# Patient Record
Sex: FEMALE | Race: WHITE | NOT HISPANIC OR LATINO | URBAN - NONMETROPOLITAN AREA
[De-identification: names, ages, dates, MRNs, and addresses within clinical notes are randomized per-mention and may not be internally consistent; named-entity substitution may affect disease eponyms.]

---

## 2021-09-27 ENCOUNTER — OFFICE VISIT (OUTPATIENT)
Dept: URBAN - NONMETROPOLITAN AREA CLINIC 13 | Facility: CLINIC | Age: 71
End: 2021-09-27

## 2021-10-06 ENCOUNTER — WEB ENCOUNTER (OUTPATIENT)
Dept: URBAN - NONMETROPOLITAN AREA CLINIC 13 | Facility: CLINIC | Age: 71
End: 2021-10-06

## 2021-10-06 ENCOUNTER — OFFICE VISIT (OUTPATIENT)
Dept: URBAN - NONMETROPOLITAN AREA CLINIC 13 | Facility: CLINIC | Age: 71
End: 2021-10-06
Payer: MEDICARE

## 2021-10-06 ENCOUNTER — LAB OUTSIDE AN ENCOUNTER (OUTPATIENT)
Dept: URBAN - NONMETROPOLITAN AREA CLINIC 13 | Facility: CLINIC | Age: 71
End: 2021-10-06

## 2021-10-06 DIAGNOSIS — R13.19 ESOPHAGEAL DYSPHAGIA: ICD-10-CM

## 2021-10-06 DIAGNOSIS — R49.0 HOARSENESS: ICD-10-CM

## 2021-10-06 DIAGNOSIS — K21.9 GASTROESOPHAGEAL REFLUX DISEASE WITHOUT ESOPHAGITIS: ICD-10-CM

## 2021-10-06 DIAGNOSIS — Z12.11 COLON CANCER SCREENING: ICD-10-CM

## 2021-10-06 PROCEDURE — 99204 OFFICE O/P NEW MOD 45 MIN: CPT | Performed by: NURSE PRACTITIONER

## 2021-10-06 RX ORDER — PANTOPRAZOLE SODIUM 40 MG/1
1 TABLET TABLET, DELAYED RELEASE ORAL BID
Qty: 180 TABLET | Refills: 3 | OUTPATIENT
Start: 2021-10-06

## 2021-10-06 RX ORDER — DEXLANSOPRAZOLE 60 MG/1
1 CAPSULE CAPSULE, DELAYED RELEASE ORAL ONCE A DAY
OUTPATIENT

## 2021-10-06 RX ORDER — DEXLANSOPRAZOLE 60 MG/1
1 CAPSULE CAPSULE, DELAYED RELEASE ORAL ONCE A DAY
Status: ACTIVE | COMMUNITY

## 2021-10-06 NOTE — HPI-TODAY'S VISIT:
10/6/21 Mrs Oralia Romero is a 72 YO F who presents for first visit sine 2014 with complaints of heartburn and reflux despite Dexilant, hoarseness, and dysphagia for solids. She had similar sypmtoms in 2014. Her dysphagia resolved after dilation. Recently, she has dyspeptic symptoms with breakthrough heartburn.  Last EGD in 2008 with mild gastritis and no findings to explain her dysphagia but improvement in her symptoms after dilation. She last had colonoscopy 3/2014 with sigmoid diverticulosis and recommended to repeat in 10 years. TG

## 2021-11-09 ENCOUNTER — CLAIMS CREATED FROM THE CLAIM WINDOW (OUTPATIENT)
Dept: URBAN - METROPOLITAN AREA CLINIC 4 | Facility: CLINIC | Age: 71
End: 2021-11-09
Payer: MEDICARE

## 2021-11-09 ENCOUNTER — OFFICE VISIT (OUTPATIENT)
Dept: URBAN - NONMETROPOLITAN AREA SURGERY CENTER 1 | Facility: SURGERY CENTER | Age: 71
End: 2021-11-09
Payer: MEDICARE

## 2021-11-09 DIAGNOSIS — K31.89 DEFORMED PYLORUS, ACQUIRED: ICD-10-CM

## 2021-11-09 DIAGNOSIS — K21.9 GASTRO-ESOPHAGEAL REFLUX DISEASE WITHOUT ESOPHAGITIS: ICD-10-CM

## 2021-11-09 DIAGNOSIS — R13.19 CERVICAL DYSPHAGIA: ICD-10-CM

## 2021-11-09 DIAGNOSIS — K21.9 ACID REFLUX: ICD-10-CM

## 2021-11-09 DIAGNOSIS — K31.89 ACQUIRED DEFORMITY OF DUODENUM: ICD-10-CM

## 2021-11-09 PROCEDURE — 43239 EGD BIOPSY SINGLE/MULTIPLE: CPT | Performed by: INTERNAL MEDICINE

## 2021-11-09 PROCEDURE — 88305 TISSUE EXAM BY PATHOLOGIST: CPT | Performed by: PATHOLOGY

## 2021-11-09 PROCEDURE — 88312 SPECIAL STAINS GROUP 1: CPT | Performed by: PATHOLOGY

## 2021-11-09 PROCEDURE — G8907 PT DOC NO EVENTS ON DISCHARG: HCPCS | Performed by: INTERNAL MEDICINE

## 2021-11-09 PROCEDURE — 43450 DILATE ESOPHAGUS 1/MULT PASS: CPT | Performed by: INTERNAL MEDICINE

## 2021-12-08 ENCOUNTER — OFFICE VISIT (OUTPATIENT)
Dept: URBAN - NONMETROPOLITAN AREA CLINIC 13 | Facility: CLINIC | Age: 71
End: 2021-12-08
Payer: MEDICARE

## 2021-12-08 ENCOUNTER — WEB ENCOUNTER (OUTPATIENT)
Dept: URBAN - NONMETROPOLITAN AREA CLINIC 13 | Facility: CLINIC | Age: 71
End: 2021-12-08

## 2021-12-08 DIAGNOSIS — Z12.11 COLON CANCER SCREENING: ICD-10-CM

## 2021-12-08 DIAGNOSIS — R13.19 ESOPHAGEAL DYSPHAGIA: ICD-10-CM

## 2021-12-08 DIAGNOSIS — R49.0 HOARSENESS: ICD-10-CM

## 2021-12-08 DIAGNOSIS — K21.9 GASTROESOPHAGEAL REFLUX DISEASE WITHOUT ESOPHAGITIS: ICD-10-CM

## 2021-12-08 PROCEDURE — 99213 OFFICE O/P EST LOW 20 MIN: CPT | Performed by: NURSE PRACTITIONER

## 2021-12-08 RX ORDER — PANTOPRAZOLE SODIUM 40 MG/1
1 TABLET TABLET, DELAYED RELEASE ORAL BID
OUTPATIENT
Start: 2021-10-06

## 2021-12-08 RX ORDER — PANTOPRAZOLE SODIUM 40 MG/1
1 TABLET TABLET, DELAYED RELEASE ORAL BID
Qty: 180 TABLET | Refills: 3 | Status: ACTIVE | COMMUNITY
Start: 2021-10-06

## 2021-12-08 NOTE — HPI-TODAY'S VISIT:
10/6/21 Mrs Oralia Romero is a 72 YO F who presents for first visit sine 2014 with complaints of heartburn and reflux despite Dexilant, hoarseness, and dysphagia for solids. She had similar sypmtoms in 2014. Her dysphagia resolved after dilation. Recently, she has dyspeptic symptoms with breakthrough heartburn.  Last EGD in 2008 with mild gastritis and no findings to explain her dysphagia but improvement in her symptoms after dilation. She last had colonoscopy 3/2014 with sigmoid diverticulosis and recommended to repeat in 10 years. TG  12/8/2021 Ms Oralia Romero presents for follow up for GERD symptoms after completing EGD and UGI.   UGI 10/29/2021: small sliding HH, mild GERD; tablet passed EGD 11/9/2021: mildly severe distal erosive esophagitis; nothing to explain dysphagia s/p empiric dilation with [place] 50Fr; small HH. Path c/w gastropathy negative for h. pylori, GE junction and random esophageal bx c/w reflux changes, no EoE or Barretts.  She is doing much better on pantoprazole 40mg bid. This works better than the Dexilant was working. She does continue with some cough that is nonproductive, hoarseness, and had severe heartburn after eating BBQ a few days ago. This resulted in some vomiting. Denies abdominal pain. No new issues. TG

## 2022-03-09 ENCOUNTER — OFFICE VISIT (OUTPATIENT)
Dept: URBAN - NONMETROPOLITAN AREA CLINIC 13 | Facility: CLINIC | Age: 72
End: 2022-03-09
Payer: MEDICARE

## 2022-03-09 ENCOUNTER — WEB ENCOUNTER (OUTPATIENT)
Dept: URBAN - NONMETROPOLITAN AREA CLINIC 13 | Facility: CLINIC | Age: 72
End: 2022-03-09

## 2022-03-09 DIAGNOSIS — R49.0 HOARSENESS: ICD-10-CM

## 2022-03-09 DIAGNOSIS — Z12.11 COLON CANCER SCREENING: ICD-10-CM

## 2022-03-09 DIAGNOSIS — R13.19 ESOPHAGEAL DYSPHAGIA: ICD-10-CM

## 2022-03-09 DIAGNOSIS — K21.9 GASTROESOPHAGEAL REFLUX DISEASE WITHOUT ESOPHAGITIS: ICD-10-CM

## 2022-03-09 PROCEDURE — 99214 OFFICE O/P EST MOD 30 MIN: CPT | Performed by: NURSE PRACTITIONER

## 2022-03-09 RX ORDER — FAMOTIDINE 40 MG/1
1 TABLET AT BEDTIME TABLET, FILM COATED ORAL ONCE A DAY
Qty: 90 TABLET | Refills: 3 | OUTPATIENT
Start: 2022-03-09

## 2022-03-09 RX ORDER — PANTOPRAZOLE SODIUM 40 MG/1
1 TABLET TABLET, DELAYED RELEASE ORAL BID
Status: ACTIVE | COMMUNITY
Start: 2021-10-06

## 2022-03-09 RX ORDER — PANTOPRAZOLE SODIUM 40 MG/1
1 TABLET 30 MINUTES BEFORE A MEAL TABLET, DELAYED RELEASE ORAL TWICE DAILY
Qty: 180 TABLET | Refills: 3
Start: 2021-10-06

## 2022-03-09 NOTE — HPI-TODAY'S VISIT:
10/6/21 Mrs Oralia Romero is a 70 YO F who presents for first visit sine 2014 with complaints of heartburn and reflux despite Dexilant, hoarseness, and dysphagia for solids. She had similar sypmtoms in 2014. Her dysphagia resolved after dilation. Recently, she has dyspeptic symptoms with breakthrough heartburn.  Last EGD in 2008 with mild gastritis and no findings to explain her dysphagia but improvement in her symptoms after dilation. TG  12/8/2021 Ms Oralia Romero presents for follow up for GERD symptoms after completing EGD and UGI.   UGI 10/29/2021: small sliding HH, mild GERD; tablet passed EGD 11/9/2021: mildly severe distal erosive esophagitis; nothing to explain dysphagia s/p empiric dilation with [place] 50Fr; small HH. Path c/w gastropathy negative for h. pylori, GE junction and random esophageal bx c/w reflux changes, no EoE or Barretts.  She is doing much better on pantoprazole 40mg bid. This works better than the Dexilant was working. She does continue with some cough that is nonproductive, hoarseness, and had severe heartburn after eating BBQ a few days ago. This resulted in some vomiting. Denies abdominal pain. No new issues. TG  3/9/22 Mrs Barton presents for follow up for GERD. Her , Kwaku, is with her today. She continues to struggle with heartburn with burning in her lower chest. At times sx are so severe she is nauseated and feels like she may vomit. She is taking pantoprazole in the morning, but after she has 2 cups of coffee. She often forgets her evening dose. If she remembers it, she takes it at bedtime rather than prior to supper. She takes Maalox or mylanta when she has severe symtpoms.  She drinks 2 cups of coffee in AM and 2 cups of coffee in the evenings. Otherwise, she is doing a good job with reflux precautions. No dysphagia since dilation in November.  She last had colonoscopy 3/2014 with sigmoid diverticulosis and recommended to repeat in 10 years. TG

## 2022-05-04 ENCOUNTER — OFFICE VISIT (OUTPATIENT)
Dept: URBAN - NONMETROPOLITAN AREA CLINIC 13 | Facility: CLINIC | Age: 72
End: 2022-05-04
Payer: MEDICARE

## 2022-05-04 DIAGNOSIS — K21.9 GASTROESOPHAGEAL REFLUX DISEASE WITHOUT ESOPHAGITIS: ICD-10-CM

## 2022-05-04 DIAGNOSIS — Z12.11 COLON CANCER SCREENING: ICD-10-CM

## 2022-05-04 DIAGNOSIS — R13.19 ESOPHAGEAL DYSPHAGIA: ICD-10-CM

## 2022-05-04 DIAGNOSIS — R49.0 HOARSENESS: ICD-10-CM

## 2022-05-04 PROCEDURE — 99213 OFFICE O/P EST LOW 20 MIN: CPT | Performed by: NURSE PRACTITIONER

## 2022-05-04 RX ORDER — FAMOTIDINE 40 MG/1
1 TABLET AT BEDTIME TABLET, FILM COATED ORAL ONCE A DAY
Qty: 90 TABLET | Refills: 3 | Status: ACTIVE | COMMUNITY
Start: 2022-03-09

## 2022-05-04 RX ORDER — PANTOPRAZOLE SODIUM 40 MG/1
1 TABLET 30 MINUTES BEFORE A MEAL TABLET, DELAYED RELEASE ORAL TWICE DAILY
Qty: 180 TABLET | Refills: 3 | Status: ACTIVE | COMMUNITY
Start: 2021-10-06

## 2022-05-04 RX ORDER — PANTOPRAZOLE SODIUM 40 MG/1
1 TABLET 30 MINUTES BEFORE A MEAL TABLET, DELAYED RELEASE ORAL TWICE DAILY
OUTPATIENT
Start: 2021-10-06

## 2022-05-04 RX ORDER — FAMOTIDINE 40 MG/1
1 TABLET AT BEDTIME TABLET, FILM COATED ORAL ONCE A DAY
OUTPATIENT
Start: 2022-03-09

## 2022-05-04 NOTE — HPI-TODAY'S VISIT:
10/6/21 Mrs Oralia Romero is a 70 YO F who presents for first visit sine 2014 with complaints of heartburn and reflux despite Dexilant, hoarseness, and dysphagia for solids. She had similar sypmtoms in 2014. Her dysphagia resolved after dilation. Recently, she has dyspeptic symptoms with breakthrough heartburn.  Last EGD in 2008 with mild gastritis and no findings to explain her dysphagia but improvement in her symptoms after dilation. TG  12/8/2021 Ms Oralia Romero presents for follow up for GERD symptoms after completing EGD and UGI.   UGI 10/29/2021: small sliding HH, mild GERD; tablet passed EGD 11/9/2021: mildly severe distal erosive esophagitis; nothing to explain dysphagia s/p empiric dilation with [place] 50Fr; small HH. Path c/w gastropathy negative for h. pylori, GE junction and random esophageal bx c/w reflux changes, no EoE or Barretts.  She is doing much better on pantoprazole 40mg bid. This works better than the Dexilant was working. She does continue with some cough that is nonproductive, hoarseness, and had severe heartburn after eating BBQ a few days ago. This resulted in some vomiting. Denies abdominal pain. No new issues. TG  3/9/22 Mrs Barton presents for follow up for GERD. Her , Kwaku, is with her today. She continues to struggle with heartburn with burning in her lower chest. At times sx are so severe she is nauseated and feels like she may vomit. She is taking pantoprazole in the morning, but after she has 2 cups of coffee. She often forgets her evening dose. If she remembers it, she takes it at bedtime rather than prior to supper. She takes Maalox or mylanta when she has severe symtpoms.  She drinks 2 cups of coffee in AM and 2 cups of coffee in the evenings. Otherwise, she is doing a good job with reflux precautions. No dysphagia since dilation in November.  She last had colonoscopy 3/2014 with sigmoid diverticulosis and recommended to repeat in 10 years. TG  5/4/2022 Ms Barton presents for follow up for GERD. Kwaku is with her today. She has reduced her coffee intake to 1 1/2 cups in AM and 1 1/2 cups in the afternoon. She is better with taking pantoprazole 30 minutes before breakfast and 30 minutes before supper with her famotidine at bedtime. Symptoms are improved. She has breakthrough that leads to nausea about once every two weeks. She is not adhering to reflux diet. We discussed this again today and gave handout. We had a long discussion regarding BRAVO and possibly surgery however she does not want to pursue that at this time. She would like to work harder on dietary management. Discussed side effects of high dose PPI and ideally would reduce to before breakfast with famotidine at bedtime. She verbalized understanding. TG

## 2022-11-04 ENCOUNTER — OFFICE VISIT (OUTPATIENT)
Dept: URBAN - NONMETROPOLITAN AREA CLINIC 13 | Facility: CLINIC | Age: 72
End: 2022-11-04
Payer: MEDICARE

## 2022-11-04 VITALS
HEART RATE: 73 BPM | SYSTOLIC BLOOD PRESSURE: 145 MMHG | BODY MASS INDEX: 24.99 KG/M2 | DIASTOLIC BLOOD PRESSURE: 89 MMHG | WEIGHT: 150 LBS | HEIGHT: 65 IN

## 2022-11-04 DIAGNOSIS — R13.19 ESOPHAGEAL DYSPHAGIA: ICD-10-CM

## 2022-11-04 DIAGNOSIS — R49.0 HOARSENESS: ICD-10-CM

## 2022-11-04 DIAGNOSIS — K21.9 GASTROESOPHAGEAL REFLUX DISEASE WITHOUT ESOPHAGITIS: ICD-10-CM

## 2022-11-04 DIAGNOSIS — Z12.11 COLON CANCER SCREENING: ICD-10-CM

## 2022-11-04 PROCEDURE — 99213 OFFICE O/P EST LOW 20 MIN: CPT | Performed by: NURSE PRACTITIONER

## 2022-11-04 RX ORDER — FAMOTIDINE 40 MG/1
1 TABLET AT BEDTIME TABLET, FILM COATED ORAL ONCE A DAY
Status: ACTIVE | COMMUNITY
Start: 2022-03-09

## 2022-11-04 RX ORDER — PANTOPRAZOLE SODIUM 40 MG/1
1 TABLET TABLET, DELAYED RELEASE ORAL TWICE A DAY
Qty: 180 TABLET | Refills: 3 | OUTPATIENT

## 2022-11-04 RX ORDER — FAMOTIDINE 40 MG/1
1 TABLET AT BEDTIME TABLET, FILM COATED ORAL ONCE A DAY
Qty: 90 TABLET | Refills: 3 | OUTPATIENT

## 2022-11-04 RX ORDER — PANTOPRAZOLE SODIUM 40 MG/1
1 TABLET 30 MINUTES BEFORE A MEAL TABLET, DELAYED RELEASE ORAL TWICE DAILY
Status: ACTIVE | COMMUNITY
Start: 2021-10-06

## 2022-11-04 NOTE — HPI-TODAY'S VISIT:
11/4/2022 Ms Barton presents for follow up for GERD. She was last seen in May by Inna Junior NP. She was doing ok at her last OV so her protonix was reduced to 40mg in the morning and pecid at bedtime. She is now having worsening reflux. She is trying to watch her diet but symptoms continue. She denies any trouble swallowing. CS

## 2023-03-03 ENCOUNTER — OFFICE VISIT (OUTPATIENT)
Dept: URBAN - NONMETROPOLITAN AREA CLINIC 13 | Facility: CLINIC | Age: 73
End: 2023-03-03

## 2023-05-25 ENCOUNTER — OFFICE VISIT (OUTPATIENT)
Dept: URBAN - NONMETROPOLITAN AREA CLINIC 13 | Facility: CLINIC | Age: 73
End: 2023-05-25
Payer: MEDICARE

## 2023-05-25 ENCOUNTER — LAB OUTSIDE AN ENCOUNTER (OUTPATIENT)
Dept: URBAN - NONMETROPOLITAN AREA CLINIC 13 | Facility: CLINIC | Age: 73
End: 2023-05-25

## 2023-05-25 VITALS
WEIGHT: 154.8 LBS | DIASTOLIC BLOOD PRESSURE: 89 MMHG | HEIGHT: 65 IN | HEART RATE: 78 BPM | BODY MASS INDEX: 25.79 KG/M2 | SYSTOLIC BLOOD PRESSURE: 150 MMHG

## 2023-05-25 DIAGNOSIS — K21.9 GASTROESOPHAGEAL REFLUX DISEASE WITHOUT ESOPHAGITIS: ICD-10-CM

## 2023-05-25 DIAGNOSIS — Z12.11 COLON CANCER SCREENING: ICD-10-CM

## 2023-05-25 DIAGNOSIS — R13.19 ESOPHAGEAL DYSPHAGIA: ICD-10-CM

## 2023-05-25 DIAGNOSIS — R49.0 HOARSENESS: ICD-10-CM

## 2023-05-25 PROCEDURE — 99214 OFFICE O/P EST MOD 30 MIN: CPT | Performed by: NURSE PRACTITIONER

## 2023-05-25 RX ORDER — FAMOTIDINE 40 MG/1
1 TABLET AT BEDTIME TABLET, FILM COATED ORAL ONCE A DAY
Qty: 90 TABLET | Refills: 3 | Status: ACTIVE | COMMUNITY

## 2023-05-25 RX ORDER — FAMOTIDINE 40 MG/1
1 TABLET AT BEDTIME TABLET, FILM COATED ORAL ONCE A DAY
Qty: 90 TABLET | Refills: 3 | OUTPATIENT

## 2023-05-25 RX ORDER — PANTOPRAZOLE SODIUM 40 MG/1
1 TABLET TABLET, DELAYED RELEASE ORAL TWICE A DAY
Qty: 180 TABLET | Refills: 3 | Status: ACTIVE | COMMUNITY

## 2023-05-25 RX ORDER — SUCRALFATE 1 G/1
1 TABLET DISSOLVED IN A TABLESPOON OF WATER ON AN EMPTY STOMACH TABLET ORAL
Qty: 120 TABLET | Refills: 11 | OUTPATIENT
Start: 2023-05-25 | End: 2024-05-19

## 2023-05-25 RX ORDER — OMEPRAZOLE 40 MG/1
1 CAPSULE 30 MINUTES BEFORE MEALS CAPSULE, DELAYED RELEASE ORAL TWICE A DAY
Qty: 180 | Refills: 3 | OUTPATIENT
Start: 2023-05-25

## 2023-05-25 NOTE — HPI-OTHER HISTORIES
10/6/21 Mrs Oralia Romero is a 72 YO F who presents for first visit sine 2014 with complaints of heartburn and reflux despite Dexilant, hoarseness, and dysphagia for solids. She had similar sypmtoms in 2014. Her dysphagia resolved after dilation. Recently, she has dyspeptic symptoms with breakthrough heartburn.  Last EGD in 2008 with mild gastritis and no findings to explain her dysphagia but improvement in her symptoms after dilation. TG  12/8/2021 Ms Oralia Romero presents for follow up for GERD symptoms after completing EGD and UGI.   UGI 10/29/2021: small sliding HH, mild GERD; tablet passed EGD 11/9/2021: mildly severe distal erosive esophagitis; nothing to explain dysphagia s/p empiric dilation with [place] 50Fr; small HH. Path c/w gastropathy negative for h. pylori, GE junction and random esophageal bx c/w reflux changes, no EoE or Barretts.  She is doing much better on pantoprazole 40mg bid. This works better than the Dexilant was working. She does continue with some cough that is nonproductive, hoarseness, and had severe heartburn after eating BBQ a few days ago. This resulted in some vomiting. Denies abdominal pain. No new issues. TG  3/9/22 Mrs Barton presents for follow up for GERD. Her , Kwaku, is with her today. She continues to struggle with heartburn with burning in her lower chest. At times sx are so severe she is nauseated and feels like she may vomit. She is taking pantoprazole in the morning, but after she has 2 cups of coffee. She often forgets her evening dose. If she remembers it, she takes it at bedtime rather than prior to supper. She takes Maalox or mylanta when she has severe symtpoms.  She drinks 2 cups of coffee in AM and 2 cups of coffee in the evenings. Otherwise, she is doing a good job with reflux precautions. No dysphagia since dilation in November.  She last had colonoscopy 3/2014 with sigmoid diverticulosis and recommended to repeat in 10 years. TG  5/4/2022 Ms Barton presents for follow up for GERD. Kwaku is with her today. She has reduced her coffee intake to 1 1/2 cups in AM and 1 1/2 cups in the afternoon. She is better with taking pantoprazole 30 minutes before breakfast and 30 minutes before supper with her famotidine at bedtime. Symptoms are improved. She has breakthrough that leads to nausea about once every two weeks. She is not adhering to reflux diet. We discussed this again today and gave handout. We had a long discussion regarding BRAVO and possibly surgery however she does not want to pursue that at this time. She would like to work harder on dietary management. Discussed side effects of high dose PPI and ideally would reduce to before breakfast with famotidine at bedtime. She verbalized understanding. TG  11/4/2022 Ms Barton presents for follow up for GERD. She was last seen in May by Inna Junior NP. She was doing ok at her last OV so her protonix was reduced to 40mg in the morning and pecid at bedtime. She is now having worsening reflux. She is trying to watch her diet but symptoms continue. She denies any trouble swallowing. CS

## 2023-05-25 NOTE — HPI-TODAY'S VISIT:
5/25/2023 Ms Barton presents for follow up for GERD. At her last OV, she was still having breakthrough on protonix 40mg daily and pepcid at bedtime. She was given protonix 40mg BID and pepcid at bedtime. Today, she continues to have a lot of burning in her esophagus. She denies any trouble swallowing. CS

## 2023-07-05 ENCOUNTER — CLAIMS CREATED FROM THE CLAIM WINDOW (OUTPATIENT)
Dept: URBAN - METROPOLITAN AREA CLINIC 4 | Facility: CLINIC | Age: 73
End: 2023-07-05
Payer: MEDICARE

## 2023-07-05 ENCOUNTER — OFFICE VISIT (OUTPATIENT)
Dept: URBAN - NONMETROPOLITAN AREA SURGERY CENTER 1 | Facility: SURGERY CENTER | Age: 73
End: 2023-07-05
Payer: MEDICARE

## 2023-07-05 DIAGNOSIS — K31.89 OTHER DISEASES OF STOMACH AND DUODENUM: ICD-10-CM

## 2023-07-05 DIAGNOSIS — T47.8X5A ADVERSE EFFECT OF OTHER AGENTS PRIMARILY AFFECTING GASTROINTESTINAL SYSTEM, INITIAL ENCOUNTER: ICD-10-CM

## 2023-07-05 DIAGNOSIS — K21.9 GASTROESOPHAGEAL REFLUX DISEASE WITHOUT ESOPHAGITIS: ICD-10-CM

## 2023-07-05 PROCEDURE — 88305 TISSUE EXAM BY PATHOLOGIST: CPT | Performed by: PATHOLOGY

## 2023-07-05 PROCEDURE — 88312 SPECIAL STAINS GROUP 1: CPT | Performed by: PATHOLOGY

## 2023-07-05 PROCEDURE — 43239 EGD BIOPSY SINGLE/MULTIPLE: CPT | Performed by: INTERNAL MEDICINE

## 2023-07-05 PROCEDURE — G8907 PT DOC NO EVENTS ON DISCHARG: HCPCS | Performed by: INTERNAL MEDICINE

## 2023-09-14 ENCOUNTER — OFFICE VISIT (OUTPATIENT)
Dept: URBAN - NONMETROPOLITAN AREA CLINIC 13 | Facility: CLINIC | Age: 73
End: 2023-09-14
Payer: MEDICARE

## 2023-09-14 ENCOUNTER — WEB ENCOUNTER (OUTPATIENT)
Dept: URBAN - NONMETROPOLITAN AREA CLINIC 13 | Facility: CLINIC | Age: 73
End: 2023-09-14

## 2023-09-14 VITALS
HEART RATE: 85 BPM | HEIGHT: 65 IN | WEIGHT: 154.6 LBS | BODY MASS INDEX: 25.76 KG/M2 | SYSTOLIC BLOOD PRESSURE: 126 MMHG | TEMPERATURE: 98.1 F | DIASTOLIC BLOOD PRESSURE: 75 MMHG

## 2023-09-14 DIAGNOSIS — R49.0 HOARSENESS: ICD-10-CM

## 2023-09-14 DIAGNOSIS — R13.19 ESOPHAGEAL DYSPHAGIA: ICD-10-CM

## 2023-09-14 DIAGNOSIS — K21.9 GASTROESOPHAGEAL REFLUX DISEASE WITHOUT ESOPHAGITIS: ICD-10-CM

## 2023-09-14 PROCEDURE — 99213 OFFICE O/P EST LOW 20 MIN: CPT | Performed by: NURSE PRACTITIONER

## 2023-09-14 RX ORDER — FAMOTIDINE 40 MG/1
1 TABLET AT BEDTIME TABLET, FILM COATED ORAL ONCE A DAY
Qty: 90 TABLET | Refills: 3 | Status: ACTIVE | COMMUNITY

## 2023-09-14 RX ORDER — OMEPRAZOLE 40 MG/1
1 CAPSULE 30 MINUTES BEFORE MEALS CAPSULE, DELAYED RELEASE ORAL TWICE A DAY
Qty: 180 | Refills: 3 | OUTPATIENT

## 2023-09-14 RX ORDER — AMITRIPTYLINE HYDROCHLORIDE 10 MG/1
1 TABLET AT BEDTIME TABLET, FILM COATED ORAL ONCE A DAY
Qty: 90 TABLET | Refills: 3 | OUTPATIENT
Start: 2023-09-14

## 2023-09-14 RX ORDER — FAMOTIDINE 40 MG/1
1 TABLET AT BEDTIME TABLET, FILM COATED ORAL ONCE A DAY
Qty: 90 TABLET | Refills: 3 | OUTPATIENT

## 2023-09-14 RX ORDER — PANTOPRAZOLE SODIUM 40 MG/1
1 TABLET TABLET, DELAYED RELEASE ORAL TWICE A DAY
Qty: 180 TABLET | Refills: 3 | Status: ACTIVE | COMMUNITY

## 2023-09-14 RX ORDER — OMEPRAZOLE 40 MG/1
1 CAPSULE 30 MINUTES BEFORE MEALS CAPSULE, DELAYED RELEASE ORAL TWICE A DAY
Qty: 180 | Refills: 3 | Status: ACTIVE | COMMUNITY
Start: 2023-05-25

## 2023-09-14 RX ORDER — SUCRALFATE 1 G/1
1 TABLET DISSOLVED IN A TABLESPOON OF WATER ON AN EMPTY STOMACH TABLET ORAL
Qty: 120 TABLET | Refills: 11 | Status: ACTIVE | COMMUNITY
Start: 2023-05-25 | End: 2024-05-19

## 2023-09-14 RX ORDER — SUCRALFATE 1 G/1
1 TABLET DISSOLVED IN A TABLESPOON OF WATER ON AN EMPTY STOMACH TABLET ORAL
Qty: 120 TABLET | Refills: 11 | OUTPATIENT

## 2023-09-14 NOTE — HPI-OTHER HISTORIES
10/6/21 Mrs Oralia Romero is a 70 YO F who presents for first visit sine 2014 with complaints of heartburn and reflux despite Dexilant, hoarseness, and dysphagia for solids. She had similar sypmtoms in 2014. Her dysphagia resolved after dilation. Recently, she has dyspeptic symptoms with breakthrough heartburn.  Last EGD in 2008 with mild gastritis and no findings to explain her dysphagia but improvement in her symptoms after dilation. TG  12/8/2021 Ms Oarlia Romero presents for follow up for GERD symptoms after completing EGD and UGI.   UGI 10/29/2021: small sliding HH, mild GERD; tablet passed EGD 11/9/2021: mildly severe distal erosive esophagitis; nothing to explain dysphagia s/p empiric dilation with [place] 50Fr; small HH. Path c/w gastropathy negative for h. pylori, GE junction and random esophageal bx c/w reflux changes, no EoE or Barretts.  She is doing much better on pantoprazole 40mg bid. This works better than the Dexilant was working. She does continue with some cough that is nonproductive, hoarseness, and had severe heartburn after eating BBQ a few days ago. This resulted in some vomiting. Denies abdominal pain. No new issues. TG  3/9/22 Mrs Barton presents for follow up for GERD. Her , Kwaku, is with her today. She continues to struggle with heartburn with burning in her lower chest. At times sx are so severe she is nauseated and feels like she may vomit. She is taking pantoprazole in the morning, but after she has 2 cups of coffee. She often forgets her evening dose. If she remembers it, she takes it at bedtime rather than prior to supper. She takes Maalox or mylanta when she has severe symtpoms.  She drinks 2 cups of coffee in AM and 2 cups of coffee in the evenings. Otherwise, she is doing a good job with reflux precautions. No dysphagia since dilation in November.  She last had colonoscopy 3/2014 with sigmoid diverticulosis and recommended to repeat in 10 years. TG  5/4/2022 Ms Barton presents for follow up for GERD. Kwaku is with her today. She has reduced her coffee intake to 1 1/2 cups in AM and 1 1/2 cups in the afternoon. She is better with taking pantoprazole 30 minutes before breakfast and 30 minutes before supper with her famotidine at bedtime. Symptoms are improved. She has breakthrough that leads to nausea about once every two weeks. She is not adhering to reflux diet. We discussed this again today and gave handout. We had a long discussion regarding BRAVO and possibly surgery however she does not want to pursue that at this time. She would like to work harder on dietary management. Discussed side effects of high dose PPI and ideally would reduce to before breakfast with famotidine at bedtime. She verbalized understanding. TG  11/4/2022 Ms Barton presents for follow up for GERD. She was last seen in May by Inna Junior NP. She was doing ok at her last OV so her protonix was reduced to 40mg in the morning and pecid at bedtime. She is now having worsening reflux. She is trying to watch her diet but symptoms continue. She denies any trouble swallowing. CS  5/25/2023 Ms Barton presents for follow up for GERD. At her last OV, she was still having breakthrough on protonix 40mg daily and pepcid at bedtime. She was given protonix 40mg BID and pepcid at bedtime. Today, she continues to have a lot of burning in her esophagus. She denies any trouble swallowing. CS  7/5/2023 EGD: smal hiatal hernia, gastritis. Path PPI effect

## 2023-09-14 NOTE — HPI-TODAY'S VISIT:
9/14/2023 Ms Barton presents for follow up for GERD. She is feeling better on omeprazole 40mg BID, pepcid, and carafate. She admits to a lot of stress and not sleeping well. She will still have some burning but overall better. CS

## 2023-11-16 ENCOUNTER — OFFICE VISIT (OUTPATIENT)
Dept: URBAN - NONMETROPOLITAN AREA CLINIC 13 | Facility: CLINIC | Age: 73
End: 2023-11-16

## 2023-11-30 ENCOUNTER — OFFICE VISIT (OUTPATIENT)
Dept: URBAN - NONMETROPOLITAN AREA CLINIC 13 | Facility: CLINIC | Age: 73
End: 2023-11-30
Payer: MEDICARE

## 2023-11-30 ENCOUNTER — LAB OUTSIDE AN ENCOUNTER (OUTPATIENT)
Dept: URBAN - NONMETROPOLITAN AREA CLINIC 13 | Facility: CLINIC | Age: 73
End: 2023-11-30

## 2023-11-30 VITALS
HEIGHT: 65 IN | SYSTOLIC BLOOD PRESSURE: 127 MMHG | WEIGHT: 160 LBS | HEART RATE: 69 BPM | DIASTOLIC BLOOD PRESSURE: 80 MMHG | BODY MASS INDEX: 26.66 KG/M2

## 2023-11-30 DIAGNOSIS — R49.0 HOARSENESS: ICD-10-CM

## 2023-11-30 DIAGNOSIS — R13.19 ESOPHAGEAL DYSPHAGIA: ICD-10-CM

## 2023-11-30 DIAGNOSIS — K21.9 GASTROESOPHAGEAL REFLUX DISEASE WITHOUT ESOPHAGITIS: ICD-10-CM

## 2023-11-30 DIAGNOSIS — Z12.11 COLON CANCER SCREENING: ICD-10-CM

## 2023-11-30 PROBLEM — 266435005: Status: ACTIVE | Noted: 2021-10-06

## 2023-11-30 PROCEDURE — 99214 OFFICE O/P EST MOD 30 MIN: CPT | Performed by: NURSE PRACTITIONER

## 2023-11-30 RX ORDER — SUCRALFATE 1 G/1
1 TABLET DISSOLVED IN A TABLESPOON OF WATER ON AN EMPTY STOMACH TABLET ORAL
Qty: 120 TABLET | Refills: 11 | OUTPATIENT

## 2023-11-30 RX ORDER — OMEPRAZOLE 40 MG/1
1 CAPSULE 30 MINUTES BEFORE MEALS CAPSULE, DELAYED RELEASE ORAL TWICE A DAY
Qty: 180 | Refills: 3 | Status: ACTIVE | COMMUNITY

## 2023-11-30 RX ORDER — FAMOTIDINE 40 MG/1
1 TABLET AT BEDTIME TABLET, FILM COATED ORAL ONCE A DAY
Qty: 90 TABLET | Refills: 3 | OUTPATIENT

## 2023-11-30 RX ORDER — SUCRALFATE 1 G/1
1 TABLET DISSOLVED IN A TABLESPOON OF WATER ON AN EMPTY STOMACH TABLET ORAL
Qty: 120 TABLET | Refills: 11 | Status: ACTIVE | COMMUNITY

## 2023-11-30 RX ORDER — AMITRIPTYLINE HYDROCHLORIDE 10 MG/1
1 TABLET AT BEDTIME TABLET, FILM COATED ORAL ONCE A DAY
Qty: 90 TABLET | Refills: 3 | Status: ACTIVE | COMMUNITY
Start: 2023-09-14

## 2023-11-30 RX ORDER — AMITRIPTYLINE HYDROCHLORIDE 10 MG/1
1 TABLET AT BEDTIME TABLET, FILM COATED ORAL ONCE A DAY
Qty: 90 TABLET | Refills: 3 | OUTPATIENT

## 2023-11-30 RX ORDER — FAMOTIDINE 40 MG/1
1 TABLET AT BEDTIME TABLET, FILM COATED ORAL ONCE A DAY
Qty: 90 TABLET | Refills: 3 | Status: ACTIVE | COMMUNITY

## 2023-11-30 RX ORDER — PANTOPRAZOLE SODIUM 40 MG/1
1 TABLET TABLET, DELAYED RELEASE ORAL TWICE A DAY
Qty: 180 TABLET | Refills: 3 | Status: ACTIVE | COMMUNITY

## 2023-11-30 RX ORDER — OMEPRAZOLE 40 MG/1
1 CAPSULE 30 MINUTES BEFORE MEALS CAPSULE, DELAYED RELEASE ORAL TWICE A DAY
Qty: 180 | Refills: 3 | OUTPATIENT

## 2023-11-30 NOTE — HPI-TODAY'S VISIT:
11/30/2023 Ms Barton presents for follow up for GERD. She was feeling a little better at her last OV on omeprazole 40mg BID, pepcid, and carafate. Today, she is no longer feeling better. She continues to have burning in her throat and esophagus. She has a feeling like food is stuck. She is taking AMT and it has helped her sleep but not changed her reflux symptoms. CS

## 2023-11-30 NOTE — HPI-OTHER HISTORIES
10/6/21 Mrs Oralia Romero is a 70 YO F who presents for first visit sine 2014 with complaints of heartburn and reflux despite Dexilant, hoarseness, and dysphagia for solids. She had similar sypmtoms in 2014. Her dysphagia resolved after dilation. Recently, she has dyspeptic symptoms with breakthrough heartburn.  Last EGD in 2008 with mild gastritis and no findings to explain her dysphagia but improvement in her symptoms after dilation. TG  12/8/2021 Ms Oralia Romero presents for follow up for GERD symptoms after completing EGD and UGI.   UGI 10/29/2021: small sliding HH, mild GERD; tablet passed EGD 11/9/2021: mildly severe distal erosive esophagitis; nothing to explain dysphagia s/p empiric dilation with [place] 50Fr; small HH. Path c/w gastropathy negative for h. pylori, GE junction and random esophageal bx c/w reflux changes, no EoE or Barretts.  She is doing much better on pantoprazole 40mg bid. This works better than the Dexilant was working. She does continue with some cough that is nonproductive, hoarseness, and had severe heartburn after eating BBQ a few days ago. This resulted in some vomiting. Denies abdominal pain. No new issues. TG  3/9/22 Mrs Barton presents for follow up for GERD. Her , Kwaku, is with her today. She continues to struggle with heartburn with burning in her lower chest. At times sx are so severe she is nauseated and feels like she may vomit. She is taking pantoprazole in the morning, but after she has 2 cups of coffee. She often forgets her evening dose. If she remembers it, she takes it at bedtime rather than prior to supper. She takes Maalox or mylanta when she has severe symtpoms.  She drinks 2 cups of coffee in AM and 2 cups of coffee in the evenings. Otherwise, she is doing a good job with reflux precautions. No dysphagia since dilation in November.  She last had colonoscopy 3/2014 with sigmoid diverticulosis and recommended to repeat in 10 years. TG  5/4/2022 Ms Barton presents for follow up for GERD. Kwaku is with her today. She has reduced her coffee intake to 1 1/2 cups in AM and 1 1/2 cups in the afternoon. She is better with taking pantoprazole 30 minutes before breakfast and 30 minutes before supper with her famotidine at bedtime. Symptoms are improved. She has breakthrough that leads to nausea about once every two weeks. She is not adhering to reflux diet. We discussed this again today and gave handout. We had a long discussion regarding BRAVO and possibly surgery however she does not want to pursue that at this time. She would like to work harder on dietary management. Discussed side effects of high dose PPI and ideally would reduce to before breakfast with famotidine at bedtime. She verbalized understanding. TG  11/4/2022 Ms Barton presents for follow up for GERD. She was last seen in May by Inna Junior NP. She was doing ok at her last OV so her protonix was reduced to 40mg in the morning and pecid at bedtime. She is now having worsening reflux. She is trying to watch her diet but symptoms continue. She denies any trouble swallowing. CS  5/25/2023 Ms Barton presents for follow up for GERD. At her last OV, she was still having breakthrough on protonix 40mg daily and pepcid at bedtime. She was given protonix 40mg BID and pepcid at bedtime. Today, she continues to have a lot of burning in her esophagus. She denies any trouble swallowing. CS  7/5/2023 EGD: smal hiatal hernia, gastritis. Path PPI effect  9/14/2023 Ms Barton presents for follow up for GERD. She is feeling better on omeprazole 40mg BID, pepcid, and carafate. She admits to a lot of stress and not sleeping well. She will still have some burning but overall better. CS

## 2024-01-22 ENCOUNTER — OFFICE VISIT (OUTPATIENT)
Dept: URBAN - NONMETROPOLITAN AREA CLINIC 13 | Facility: CLINIC | Age: 74
End: 2024-01-22

## 2024-02-29 ENCOUNTER — OV EP (OUTPATIENT)
Dept: URBAN - NONMETROPOLITAN AREA CLINIC 13 | Facility: CLINIC | Age: 74
End: 2024-02-29
Payer: MEDICARE

## 2024-02-29 VITALS
BODY MASS INDEX: 27.16 KG/M2 | HEART RATE: 86 BPM | HEIGHT: 65 IN | WEIGHT: 163 LBS | SYSTOLIC BLOOD PRESSURE: 148 MMHG | DIASTOLIC BLOOD PRESSURE: 83 MMHG

## 2024-02-29 DIAGNOSIS — K21.9 GASTROESOPHAGEAL REFLUX DISEASE WITHOUT ESOPHAGITIS: ICD-10-CM

## 2024-02-29 DIAGNOSIS — R49.0 HOARSENESS: ICD-10-CM

## 2024-02-29 DIAGNOSIS — R13.19 ESOPHAGEAL DYSPHAGIA: ICD-10-CM

## 2024-02-29 DIAGNOSIS — Z12.11 COLON CANCER SCREENING: ICD-10-CM

## 2024-02-29 PROCEDURE — 99213 OFFICE O/P EST LOW 20 MIN: CPT | Performed by: NURSE PRACTITIONER

## 2024-02-29 RX ORDER — AMITRIPTYLINE HYDROCHLORIDE 10 MG/1
1 TABLET AT BEDTIME TABLET, FILM COATED ORAL ONCE A DAY
Qty: 90 TABLET | Refills: 3 | Status: ACTIVE | COMMUNITY

## 2024-02-29 RX ORDER — SUCRALFATE 1 G/1
1 TABLET DISSOLVED IN A TABLESPOON OF WATER ON AN EMPTY STOMACH TABLET ORAL
Qty: 120 TABLET | Refills: 11 | OUTPATIENT

## 2024-02-29 RX ORDER — OMEPRAZOLE 40 MG/1
1 CAPSULE 30 MINUTES BEFORE MEALS CAPSULE, DELAYED RELEASE ORAL TWICE A DAY
Qty: 180 | Refills: 3 | OUTPATIENT

## 2024-02-29 RX ORDER — SUCRALFATE 1 G/1
1 TABLET DISSOLVED IN A TABLESPOON OF WATER ON AN EMPTY STOMACH TABLET ORAL
Qty: 120 TABLET | Refills: 11 | Status: ACTIVE | COMMUNITY

## 2024-02-29 RX ORDER — AMITRIPTYLINE HYDROCHLORIDE 10 MG/1
1 TABLET AT BEDTIME TABLET, FILM COATED ORAL ONCE A DAY
Qty: 90 TABLET | Refills: 3 | OUTPATIENT

## 2024-02-29 RX ORDER — DEXLANSOPRAZOLE 60 MG/1
1 CAPSULE CAPSULE, DELAYED RELEASE ORAL ONCE A DAY
Qty: 90 CAPSULE | Refills: 3 | OUTPATIENT
Start: 2024-02-29

## 2024-02-29 RX ORDER — OMEPRAZOLE 40 MG/1
1 CAPSULE 30 MINUTES BEFORE MEALS CAPSULE, DELAYED RELEASE ORAL TWICE A DAY
Qty: 180 | Refills: 3 | Status: ACTIVE | COMMUNITY

## 2024-02-29 RX ORDER — FAMOTIDINE 40 MG/1
1 TABLET AT BEDTIME TABLET, FILM COATED ORAL ONCE A DAY
Qty: 90 TABLET | Refills: 3 | OUTPATIENT

## 2024-02-29 RX ORDER — PANTOPRAZOLE SODIUM 40 MG/1
1 TABLET TABLET, DELAYED RELEASE ORAL TWICE A DAY
Qty: 180 TABLET | Refills: 3 | Status: ACTIVE | COMMUNITY

## 2024-02-29 RX ORDER — FAMOTIDINE 40 MG/1
1 TABLET AT BEDTIME TABLET, FILM COATED ORAL ONCE A DAY
Qty: 90 TABLET | Refills: 3 | Status: ACTIVE | COMMUNITY

## 2024-02-29 NOTE — HPI-TODAY'S VISIT:
2/29/2024 Ms Barton presents for follow up for GERD. At  last OV, she continued to have burning in her throat and esophagus and feeling like food was getting stuck. She had an upper GI series that showed reflux into the thoracic esophagus and spasms of the esophagus. No narrowing. Today, she feels the carafate has helped but she continues to have reflux despite omeprazole and pepcid. She has tried and failed protonix and nexium in the past. CS

## 2024-02-29 NOTE — HPI-OTHER HISTORIES
10/6/21 Mrs Oralia Romero is a 70 YO F who presents for first visit sine 2014 with complaints of heartburn and reflux despite Dexilant, hoarseness, and dysphagia for solids. She had similar sypmtoms in 2014. Her dysphagia resolved after dilation. Recently, she has dyspeptic symptoms with breakthrough heartburn.  Last EGD in 2008 with mild gastritis and no findings to explain her dysphagia but improvement in her symptoms after dilation. TG  12/8/2021 Ms Oralia Romero presents for follow up for GERD symptoms after completing EGD and UGI.   UGI 10/29/2021: small sliding HH, mild GERD; tablet passed EGD 11/9/2021: mildly severe distal erosive esophagitis; nothing to explain dysphagia s/p empiric dilation with [place] 50Fr; small HH. Path c/w gastropathy negative for h. pylori, GE junction and random esophageal bx c/w reflux changes, no EoE or Barretts.  She is doing much better on pantoprazole 40mg bid. This works better than the Dexilant was working. She does continue with some cough that is nonproductive, hoarseness, and had severe heartburn after eating BBQ a few days ago. This resulted in some vomiting. Denies abdominal pain. No new issues. TG  3/9/22 Mrs Barton presents for follow up for GERD. Her , Kwaku, is with her today. She continues to struggle with heartburn with burning in her lower chest. At times sx are so severe she is nauseated and feels like she may vomit. She is taking pantoprazole in the morning, but after she has 2 cups of coffee. She often forgets her evening dose. If she remembers it, she takes it at bedtime rather than prior to supper. She takes Maalox or mylanta when she has severe symtpoms.  She drinks 2 cups of coffee in AM and 2 cups of coffee in the evenings. Otherwise, she is doing a good job with reflux precautions. No dysphagia since dilation in November.  She last had colonoscopy 3/2014 with sigmoid diverticulosis and recommended to repeat in 10 years. TG  5/4/2022 Ms Barton presents for follow up for GERD. wKaku is with her today. She has reduced her coffee intake to 1 1/2 cups in AM and 1 1/2 cups in the afternoon. She is better with taking pantoprazole 30 minutes before breakfast and 30 minutes before supper with her famotidine at bedtime. Symptoms are improved. She has breakthrough that leads to nausea about once every two weeks. She is not adhering to reflux diet. We discussed this again today and gave handout. We had a long discussion regarding BRAVO and possibly surgery however she does not want to pursue that at this time. She would like to work harder on dietary management. Discussed side effects of high dose PPI and ideally would reduce to before breakfast with famotidine at bedtime. She verbalized understanding. TG  11/4/2022 Ms Barton presents for follow up for GERD. She was last seen in May by Inna Junior NP. She was doing ok at her last OV so her protonix was reduced to 40mg in the morning and pecid at bedtime. She is now having worsening reflux. She is trying to watch her diet but symptoms continue. She denies any trouble swallowing. CS  5/25/2023 Ms Barton presents for follow up for GERD. At her last OV, she was still having breakthrough on protonix 40mg daily and pepcid at bedtime. She was given protonix 40mg BID and pepcid at bedtime. Today, she continues to have a lot of burning in her esophagus. She denies any trouble swallowing. CS  7/5/2023 EGD: smal hiatal hernia, gastritis. Path PPI effect  9/14/2023 Ms Barton presents for follow up for GERD. She is feeling better on omeprazole 40mg BID, pepcid, and carafate. She admits to a lot of stress and not sleeping well. She will still have some burning but overall better. CS 11/30/2023 Ms Barton presents for follow up for GERD. She was feeling a little better at her last OV on omeprazole 40mg BID, pepcid, and carafate. Today, she is no longer feeling better. She continues to have burning in her throat and esophagus. She has a feeling like food is stuck. She is taking AMT and it has helped her sleep but not changed her reflux symptoms. CS

## 2024-02-29 NOTE — PHYSICAL EXAM EYES:
Conjuntivae and eyelids appear normal, Sclerae : White without injection Patient has been identified as having an implanted cardiac rhythm device (CRD); the implanted device is a [employer] pacemaker.      Planned procedure: Total arthroplasty Left shoulder.     PPM in LUCW - PPM reprogramming is required pre and post operatively.    74% A pacing.  Underlying rhythm on 9/1/23 c/w A pacing @ 30 bpm.     For additional questions, please contact the Arrhythmia Department at Ext [phone].

## 2024-05-30 ENCOUNTER — DASHBOARD ENCOUNTERS (OUTPATIENT)
Age: 74
End: 2024-05-30

## 2024-05-30 ENCOUNTER — LAB OUTSIDE AN ENCOUNTER (OUTPATIENT)
Dept: URBAN - NONMETROPOLITAN AREA CLINIC 13 | Facility: CLINIC | Age: 74
End: 2024-05-30

## 2024-05-30 ENCOUNTER — OFFICE VISIT (OUTPATIENT)
Dept: URBAN - NONMETROPOLITAN AREA CLINIC 13 | Facility: CLINIC | Age: 74
End: 2024-05-30
Payer: MEDICARE

## 2024-05-30 VITALS
DIASTOLIC BLOOD PRESSURE: 77 MMHG | HEIGHT: 65 IN | BODY MASS INDEX: 26.87 KG/M2 | HEART RATE: 76 BPM | SYSTOLIC BLOOD PRESSURE: 123 MMHG | WEIGHT: 161.3 LBS

## 2024-05-30 DIAGNOSIS — R13.19 ESOPHAGEAL DYSPHAGIA: ICD-10-CM

## 2024-05-30 DIAGNOSIS — K21.9 GASTROESOPHAGEAL REFLUX DISEASE WITHOUT ESOPHAGITIS: ICD-10-CM

## 2024-05-30 DIAGNOSIS — R49.0 HOARSENESS: ICD-10-CM

## 2024-05-30 DIAGNOSIS — Z12.11 COLON CANCER SCREENING: ICD-10-CM

## 2024-05-30 PROCEDURE — 99214 OFFICE O/P EST MOD 30 MIN: CPT | Performed by: NURSE PRACTITIONER

## 2024-05-30 RX ORDER — PANTOPRAZOLE SODIUM 40 MG/1
1 TABLET TABLET, DELAYED RELEASE ORAL TWICE A DAY
Qty: 180 TABLET | Refills: 3 | Status: ACTIVE | COMMUNITY

## 2024-05-30 RX ORDER — AMITRIPTYLINE HYDROCHLORIDE 10 MG/1
1 TABLET AT BEDTIME TABLET, FILM COATED ORAL ONCE A DAY
Qty: 90 TABLET | Refills: 3 | Status: ACTIVE | COMMUNITY

## 2024-05-30 RX ORDER — SUCRALFATE 1 G/1
1 TABLET DISSOLVED IN A TABLESPOON OF WATER ON AN EMPTY STOMACH TABLET ORAL
Qty: 120 TABLET | Refills: 11 | Status: ACTIVE | COMMUNITY

## 2024-05-30 RX ORDER — SUCRALFATE 1 G/1
1 TABLET DISSOLVED IN A TABLESPOON OF WATER ON AN EMPTY STOMACH TABLET ORAL
Qty: 120 TABLET | Refills: 11 | OUTPATIENT

## 2024-05-30 RX ORDER — OMEPRAZOLE 40 MG/1
1 CAPSULE 30 MINUTES BEFORE MEALS CAPSULE, DELAYED RELEASE ORAL TWICE A DAY
Qty: 180 | Refills: 3 | OUTPATIENT

## 2024-05-30 RX ORDER — DEXLANSOPRAZOLE 60 MG/1
1 CAPSULE CAPSULE, DELAYED RELEASE ORAL ONCE A DAY
Qty: 90 CAPSULE | Refills: 3 | Status: ACTIVE | COMMUNITY
Start: 2024-02-29

## 2024-05-30 RX ORDER — LEVOTHYROXINE SODIUM 25 UG/1
TAKE 1 TABLET BY MOUTH EVERY DAY TABLET ORAL
Qty: 90 EACH | Refills: 1 | Status: ACTIVE | COMMUNITY

## 2024-05-30 RX ORDER — AMITRIPTYLINE HYDROCHLORIDE 10 MG/1
1 TABLET AT BEDTIME TABLET, FILM COATED ORAL ONCE A DAY
Qty: 90 TABLET | Refills: 3 | OUTPATIENT

## 2024-05-30 RX ORDER — ATORVASTATIN CALCIUM 10 MG/1
TAKE 1 TABLET BY MOUTH AT BEDTIME TABLET, FILM COATED ORAL
Qty: 90 EACH | Refills: 0 | Status: ACTIVE | COMMUNITY

## 2024-05-30 RX ORDER — OMEPRAZOLE 40 MG/1
1 CAPSULE 30 MINUTES BEFORE MEALS CAPSULE, DELAYED RELEASE ORAL TWICE A DAY
Qty: 180 | Refills: 3 | Status: ACTIVE | COMMUNITY

## 2024-05-30 RX ORDER — LOSARTAN POTASSIUM AND HYDROCHLOROTHIAZIDE 50; 12.5 MG/1; MG/1
TAKE 1 TABLET BY MOUTH EVERY DAY TABLET, FILM COATED ORAL
Qty: 90 EACH | Refills: 2 | Status: ACTIVE | COMMUNITY

## 2024-05-30 RX ORDER — FAMOTIDINE 40 MG/1
1 TABLET AT BEDTIME TABLET, FILM COATED ORAL ONCE A DAY
Qty: 90 TABLET | Refills: 3 | Status: ACTIVE | COMMUNITY

## 2024-05-30 RX ORDER — FAMOTIDINE 40 MG/1
1 TABLET AT BEDTIME TABLET, FILM COATED ORAL ONCE A DAY
Qty: 90 TABLET | Refills: 3 | OUTPATIENT

## 2024-05-30 RX ORDER — DEXLANSOPRAZOLE 60 MG/1
1 CAPSULE CAPSULE, DELAYED RELEASE ORAL ONCE A DAY
Qty: 90 CAPSULE | Refills: 3 | OUTPATIENT

## 2024-07-11 ENCOUNTER — CLAIMS CREATED FROM THE CLAIM WINDOW (OUTPATIENT)
Dept: URBAN - NONMETROPOLITAN AREA SURGERY CENTER 1 | Facility: SURGERY CENTER | Age: 74
End: 2024-07-11
Payer: MEDICARE

## 2024-07-11 ENCOUNTER — CLAIMS CREATED FROM THE CLAIM WINDOW (OUTPATIENT)
Dept: URBAN - METROPOLITAN AREA CLINIC 4 | Facility: CLINIC | Age: 74
End: 2024-07-11
Payer: MEDICARE

## 2024-07-11 DIAGNOSIS — R12 BURNING REFLUX: ICD-10-CM

## 2024-07-11 DIAGNOSIS — K44.9 HIATAL HERNIA: ICD-10-CM

## 2024-07-11 DIAGNOSIS — D12.2 ADENOMA OF ASCENDING COLON: ICD-10-CM

## 2024-07-11 DIAGNOSIS — K21.9 GASTROESOPHAGEAL REFLUX DISEASE: ICD-10-CM

## 2024-07-11 DIAGNOSIS — D12.2 BENIGN NEOPLASM OF ASCENDING COLON: ICD-10-CM

## 2024-07-11 DIAGNOSIS — K57.30 DIVERTICULOSIS OF SIGMOID COLON: ICD-10-CM

## 2024-07-11 DIAGNOSIS — Z12.11 COLON CANCER SCREENING: ICD-10-CM

## 2024-07-11 DIAGNOSIS — Z12.11 ENCOUNTER SCREENING FOR MALIGNANT NEOPLASM OF COLON: ICD-10-CM

## 2024-07-11 PROCEDURE — 43235 EGD DIAGNOSTIC BRUSH WASH: CPT | Performed by: CLINIC/CENTER

## 2024-07-11 PROCEDURE — 00813 ANES UPR LWR GI NDSC PX: CPT | Performed by: NURSE ANESTHETIST, CERTIFIED REGISTERED

## 2024-07-11 PROCEDURE — 45385 COLONOSCOPY W/LESION REMOVAL: CPT | Performed by: INTERNAL MEDICINE

## 2024-07-11 PROCEDURE — 43235 EGD DIAGNOSTIC BRUSH WASH: CPT | Performed by: INTERNAL MEDICINE

## 2024-07-11 PROCEDURE — 45385 COLONOSCOPY W/LESION REMOVAL: CPT | Performed by: CLINIC/CENTER

## 2024-07-11 PROCEDURE — 88305 TISSUE EXAM BY PATHOLOGIST: CPT | Performed by: PATHOLOGY

## 2024-07-11 RX ORDER — PANTOPRAZOLE SODIUM 40 MG/1
1 TABLET TABLET, DELAYED RELEASE ORAL TWICE A DAY
Qty: 180 TABLET | Refills: 3 | Status: ACTIVE | COMMUNITY

## 2024-07-11 RX ORDER — ATORVASTATIN CALCIUM 10 MG/1
TAKE 1 TABLET BY MOUTH AT BEDTIME TABLET, FILM COATED ORAL
Qty: 90 EACH | Refills: 0 | Status: ACTIVE | COMMUNITY

## 2024-07-11 RX ORDER — FAMOTIDINE 40 MG/1
1 TABLET AT BEDTIME TABLET, FILM COATED ORAL ONCE A DAY
Qty: 90 TABLET | Refills: 3 | Status: ACTIVE | COMMUNITY

## 2024-07-11 RX ORDER — AMITRIPTYLINE HYDROCHLORIDE 10 MG/1
1 TABLET AT BEDTIME TABLET, FILM COATED ORAL ONCE A DAY
Qty: 90 TABLET | Refills: 3 | Status: ACTIVE | COMMUNITY

## 2024-07-11 RX ORDER — OMEPRAZOLE 40 MG/1
1 CAPSULE 30 MINUTES BEFORE MEALS CAPSULE, DELAYED RELEASE ORAL TWICE A DAY
Qty: 180 | Refills: 3 | Status: ACTIVE | COMMUNITY

## 2024-07-11 RX ORDER — LEVOTHYROXINE SODIUM 25 UG/1
TAKE 1 TABLET BY MOUTH EVERY DAY TABLET ORAL
Qty: 90 EACH | Refills: 1 | Status: ACTIVE | COMMUNITY

## 2024-07-11 RX ORDER — LOSARTAN POTASSIUM AND HYDROCHLOROTHIAZIDE 50; 12.5 MG/1; MG/1
TAKE 1 TABLET BY MOUTH EVERY DAY TABLET, FILM COATED ORAL
Qty: 90 EACH | Refills: 2 | Status: ACTIVE | COMMUNITY

## 2024-07-11 RX ORDER — SUCRALFATE 1 G/1
1 TABLET DISSOLVED IN A TABLESPOON OF WATER ON AN EMPTY STOMACH TABLET ORAL
Qty: 120 TABLET | Refills: 11 | Status: ACTIVE | COMMUNITY

## 2024-07-11 RX ORDER — DEXLANSOPRAZOLE 60 MG/1
1 CAPSULE CAPSULE, DELAYED RELEASE ORAL ONCE A DAY
Qty: 90 CAPSULE | Refills: 3 | Status: ACTIVE | COMMUNITY

## 2024-08-08 ENCOUNTER — OFFICE VISIT (OUTPATIENT)
Dept: URBAN - NONMETROPOLITAN AREA CLINIC 13 | Facility: CLINIC | Age: 74
End: 2024-08-08
Payer: MEDICARE

## 2024-08-08 DIAGNOSIS — R13.19 ESOPHAGEAL DYSPHAGIA: ICD-10-CM

## 2024-08-08 DIAGNOSIS — Z12.11 COLON CANCER SCREENING: ICD-10-CM

## 2024-08-08 DIAGNOSIS — R49.0 HOARSENESS: ICD-10-CM

## 2024-08-08 DIAGNOSIS — K21.9 GASTROESOPHAGEAL REFLUX DISEASE WITHOUT ESOPHAGITIS: ICD-10-CM

## 2024-08-08 PROCEDURE — 99213 OFFICE O/P EST LOW 20 MIN: CPT | Performed by: NURSE PRACTITIONER

## 2024-08-08 RX ORDER — AMITRIPTYLINE HYDROCHLORIDE 25 MG/1
1 TABLET AT BEDTIME TABLET, FILM COATED ORAL ONCE A DAY
Qty: 90 TABLET | Refills: 3 | OUTPATIENT

## 2024-08-08 RX ORDER — FAMOTIDINE 40 MG/1
1 TABLET AT BEDTIME TABLET, FILM COATED ORAL ONCE A DAY
Qty: 90 TABLET | Refills: 3 | Status: ACTIVE | COMMUNITY

## 2024-08-08 RX ORDER — PANTOPRAZOLE SODIUM 40 MG/1
1 TABLET TABLET, DELAYED RELEASE ORAL TWICE A DAY
Qty: 180 TABLET | Refills: 3 | Status: ACTIVE | COMMUNITY

## 2024-08-08 RX ORDER — SUCRALFATE 1 G/1
1 TABLET DISSOLVED IN A TABLESPOON OF WATER ON AN EMPTY STOMACH TABLET ORAL
Qty: 120 TABLET | Refills: 11 | OUTPATIENT

## 2024-08-08 RX ORDER — AMITRIPTYLINE HYDROCHLORIDE 10 MG/1
1 TABLET AT BEDTIME TABLET, FILM COATED ORAL ONCE A DAY
Qty: 90 TABLET | Refills: 3 | Status: ACTIVE | COMMUNITY

## 2024-08-08 RX ORDER — SUCRALFATE 1 G/1
1 TABLET DISSOLVED IN A TABLESPOON OF WATER ON AN EMPTY STOMACH TABLET ORAL
Qty: 120 TABLET | Refills: 11 | Status: ACTIVE | COMMUNITY

## 2024-08-08 RX ORDER — OMEPRAZOLE 40 MG/1
1 CAPSULE 30 MINUTES BEFORE MEALS CAPSULE, DELAYED RELEASE ORAL TWICE A DAY
Qty: 180 | Refills: 3 | Status: ACTIVE | COMMUNITY

## 2024-08-08 RX ORDER — FAMOTIDINE 40 MG/1
1 TABLET AT BEDTIME TABLET, FILM COATED ORAL ONCE A DAY
Qty: 90 TABLET | Refills: 3 | OUTPATIENT

## 2024-08-08 RX ORDER — ATORVASTATIN CALCIUM 10 MG/1
TAKE 1 TABLET BY MOUTH AT BEDTIME TABLET, FILM COATED ORAL
Qty: 90 EACH | Refills: 0 | Status: ACTIVE | COMMUNITY

## 2024-08-08 RX ORDER — LEVOTHYROXINE SODIUM 25 UG/1
TAKE 1 TABLET BY MOUTH EVERY DAY TABLET ORAL
Qty: 90 EACH | Refills: 1 | Status: ACTIVE | COMMUNITY

## 2024-08-08 RX ORDER — DEXLANSOPRAZOLE 60 MG/1
1 CAPSULE CAPSULE, DELAYED RELEASE ORAL ONCE A DAY
Qty: 90 CAPSULE | Refills: 3 | Status: ACTIVE | COMMUNITY

## 2024-08-08 RX ORDER — LOSARTAN POTASSIUM AND HYDROCHLOROTHIAZIDE 50; 12.5 MG/1; MG/1
TAKE 1 TABLET BY MOUTH EVERY DAY TABLET, FILM COATED ORAL
Qty: 90 EACH | Refills: 2 | Status: ACTIVE | COMMUNITY

## 2024-08-08 RX ORDER — VONOPRAZAN FUMARATE 26.72 MG/1
1 TABLET TABLET ORAL ONCE A DAY
Qty: 90 TABLET | Refills: 3 | OUTPATIENT
Start: 2024-08-08 | End: 2025-08-03

## 2024-08-08 NOTE — HPI-OTHER HISTORIES
10/6/21 Mrs Oralia Romero is a 70 YO F who presents for first visit sine 2014 with complaints of heartburn and reflux despite Dexilant, hoarseness, and dysphagia for solids. She had similar sypmtoms in 2014. Her dysphagia resolved after dilation. Recently, she has dyspeptic symptoms with breakthrough heartburn.  Last EGD in 2008 with mild gastritis and no findings to explain her dysphagia but improvement in her symptoms after dilation. TG  12/8/2021 Ms Oralia Romero presents for follow up for GERD symptoms after completing EGD and UGI.   UGI 10/29/2021: small sliding HH, mild GERD; tablet passed EGD 11/9/2021: mildly severe distal erosive esophagitis; nothing to explain dysphagia s/p empiric dilation with [place] 50Fr; small HH. Path c/w gastropathy negative for h. pylori, GE junction and random esophageal bx c/w reflux changes, no EoE or Barretts.  She is doing much better on pantoprazole 40mg bid. This works better than the Dexilant was working. She does continue with some cough that is nonproductive, hoarseness, and had severe heartburn after eating BBQ a few days ago. This resulted in some vomiting. Denies abdominal pain. No new issues. TG  3/9/22 Mrs Barton presents for follow up for GERD. Her , Kwaku, is with her today. She continues to struggle with heartburn with burning in her lower chest. At times sx are so severe she is nauseated and feels like she may vomit. She is taking pantoprazole in the morning, but after she has 2 cups of coffee. She often forgets her evening dose. If she remembers it, she takes it at bedtime rather than prior to supper. She takes Maalox or mylanta when she has severe symtpoms.  She drinks 2 cups of coffee in AM and 2 cups of coffee in the evenings. Otherwise, she is doing a good job with reflux precautions. No dysphagia since dilation in November.  She last had colonoscopy 3/2014 with sigmoid diverticulosis and recommended to repeat in 10 years. TG  5/4/2022 Ms Barton presents for follow up for GERD. Kwaku is with her today. She has reduced her coffee intake to 1 1/2 cups in AM and 1 1/2 cups in the afternoon. She is better with taking pantoprazole 30 minutes before breakfast and 30 minutes before supper with her famotidine at bedtime. Symptoms are improved. She has breakthrough that leads to nausea about once every two weeks. She is not adhering to reflux diet. We discussed this again today and gave handout. We had a long discussion regarding BRAVO and possibly surgery however she does not want to pursue that at this time. She would like to work harder on dietary management. Discussed side effects of high dose PPI and ideally would reduce to before breakfast with famotidine at bedtime. She verbalized understanding. TG  11/4/2022 Ms Barton presents for follow up for GERD. She was last seen in May by Inna Junior NP. She was doing ok at her last OV so her protonix was reduced to 40mg in the morning and pecid at bedtime. She is now having worsening reflux. She is trying to watch her diet but symptoms continue. She denies any trouble swallowing. CS  5/25/2023 Ms Barton presents for follow up for GERD. At her last OV, she was still having breakthrough on protonix 40mg daily and pepcid at bedtime. She was given protonix 40mg BID and pepcid at bedtime. Today, she continues to have a lot of burning in her esophagus. She denies any trouble swallowing. CS  7/5/2023 EGD: smal hiatal hernia, gastritis. Path PPI effect  9/14/2023 Ms Barton presents for follow up for GERD. She is feeling better on omeprazole 40mg BID, pepcid, and carafate. She admits to a lot of stress and not sleeping well. She will still have some burning but overall better. CS 11/30/2023 Ms Barton presents for follow up for GERD. She was feeling a little better at her last OV on omeprazole 40mg BID, pepcid, and carafate. Today, she is no longer feeling better. She continues to have burning in her throat and esophagus. She has a feeling like food is stuck. She is taking AMT and it has helped her sleep but not changed her reflux symptoms. CS  2/29/2024 Ms Barton presents for follow up for GERD. At  last OV, she continued to have burning in her throat and esophagus and feeling like food was getting stuck. She had an upper GI series that showed reflux into the thoracic esophagus and spasms of the esophagus. No narrowing. Today, she feels the carafate has helped but she continues to have reflux despite omeprazole and pepcid. She has tried and failed protonix and nexium in the past. CS  5/30/2024 Ms Barton presents for follow up for GERD. At  last OV, she felt the carafate helped but she continued to have reflux despite omeprazole and pepcid and she continued to have food get stuck. She was changed to dexilant and AMT was added. Today, she never got the dexilant. She does however feel like things are a little better than they were but not resovled. She does feel the AMT helped more in the beginning but not as much now. She is due for colonoscopy. Her bowels are normal.  CS  7/11/2024 EGD: small hiatal hernia, normal esophagus, stomach and duodenum Colonoscopy: one small TA polyp in ascending, diverticulosis, and looping colon

## 2024-08-08 NOTE — HPI-TODAY'S VISIT:
8/8/2024 Ms Barton presents for follow up for GERD. At  last OV, she continued to have food get stuck and having reflux. She was given dexilant and AMT. her EGD was normal except for a small hiatal hernia. Today her reflux continues to be an issue. She tried the voquezna samples and thinks it helped. She did not get the dexilant. The carafate helps the most. CS

## 2024-09-16 ENCOUNTER — TELEPHONE ENCOUNTER (OUTPATIENT)
Dept: URBAN - NONMETROPOLITAN AREA CLINIC 2 | Facility: CLINIC | Age: 74
End: 2024-09-16

## 2024-09-16 RX ORDER — FAMOTIDINE 40 MG/1
1 TABLET AT BEDTIME TABLET, FILM COATED ORAL ONCE A DAY
Qty: 90 TABLET | Refills: 3

## 2024-11-14 ENCOUNTER — TELEPHONE ENCOUNTER (OUTPATIENT)
Dept: URBAN - NONMETROPOLITAN AREA CLINIC 2 | Facility: CLINIC | Age: 74
End: 2024-11-14

## 2024-11-14 ENCOUNTER — OFFICE VISIT (OUTPATIENT)
Dept: URBAN - NONMETROPOLITAN AREA CLINIC 13 | Facility: CLINIC | Age: 74
End: 2024-11-14
Payer: MEDICARE

## 2024-11-14 VITALS
SYSTOLIC BLOOD PRESSURE: 132 MMHG | HEIGHT: 65 IN | DIASTOLIC BLOOD PRESSURE: 80 MMHG | HEART RATE: 88 BPM | WEIGHT: 164 LBS | BODY MASS INDEX: 27.32 KG/M2

## 2024-11-14 DIAGNOSIS — R49.0 HOARSENESS: ICD-10-CM

## 2024-11-14 DIAGNOSIS — Z09 ENCOUNTER FOR FOLLOW-UP: ICD-10-CM

## 2024-11-14 DIAGNOSIS — Z86.0101 HX OF ADENOMATOUS POLYP OF COLON: ICD-10-CM

## 2024-11-14 DIAGNOSIS — R13.19 ESOPHAGEAL DYSPHAGIA: ICD-10-CM

## 2024-11-14 DIAGNOSIS — K21.9 GASTROESOPHAGEAL REFLUX DISEASE WITHOUT ESOPHAGITIS: ICD-10-CM

## 2024-11-14 PROCEDURE — 99213 OFFICE O/P EST LOW 20 MIN: CPT | Performed by: NURSE PRACTITIONER

## 2024-11-14 RX ORDER — OMEPRAZOLE 40 MG/1
1 CAPSULE 30 MINUTES BEFORE MEALS CAPSULE, DELAYED RELEASE ORAL TWICE A DAY
Qty: 180 | Refills: 3 | Status: ACTIVE | COMMUNITY

## 2024-11-14 RX ORDER — LOSARTAN POTASSIUM AND HYDROCHLOROTHIAZIDE 50; 12.5 MG/1; MG/1
TAKE 1 TABLET BY MOUTH EVERY DAY TABLET, FILM COATED ORAL
Qty: 90 EACH | Refills: 2 | Status: ACTIVE | COMMUNITY

## 2024-11-14 RX ORDER — ATORVASTATIN CALCIUM 10 MG/1
TAKE 1 TABLET BY MOUTH AT BEDTIME TABLET, FILM COATED ORAL
Qty: 90 EACH | Refills: 0 | Status: ACTIVE | COMMUNITY

## 2024-11-14 RX ORDER — VONOPRAZAN FUMARATE 26.72 MG/1
1 TABLET TABLET ORAL ONCE A DAY
Qty: 90 TABLET | Refills: 3 | Status: ACTIVE | COMMUNITY
Start: 2024-08-08 | End: 2025-08-03

## 2024-11-14 RX ORDER — AMITRIPTYLINE HYDROCHLORIDE 25 MG/1
1 TABLET AT BEDTIME TABLET, FILM COATED ORAL ONCE A DAY
Qty: 90 TABLET | Refills: 3 | Status: ACTIVE | COMMUNITY

## 2024-11-14 RX ORDER — FAMOTIDINE 40 MG/1
1 TABLET AT BEDTIME TABLET, FILM COATED ORAL ONCE A DAY
Qty: 90 TABLET | Refills: 3 | OUTPATIENT

## 2024-11-14 RX ORDER — LEVOTHYROXINE SODIUM 25 UG/1
TAKE 1 TABLET BY MOUTH EVERY DAY TABLET ORAL
Qty: 90 EACH | Refills: 1 | Status: ACTIVE | COMMUNITY

## 2024-11-14 RX ORDER — FAMOTIDINE 40 MG/1
1 TABLET AT BEDTIME TABLET, FILM COATED ORAL ONCE A DAY
Qty: 90 TABLET | Refills: 3 | Status: ACTIVE | COMMUNITY

## 2024-11-14 RX ORDER — SUCRALFATE 1 G/1
1 TABLET DISSOLVED IN A TABLESPOON OF WATER ON AN EMPTY STOMACH TABLET ORAL
Qty: 120 TABLET | Refills: 11 | OUTPATIENT

## 2024-11-14 RX ORDER — VONOPRAZAN FUMARATE 26.72 MG/1
1 TABLET TABLET ORAL ONCE A DAY
Qty: 90 TABLET | Refills: 3 | OUTPATIENT

## 2024-11-14 RX ORDER — SUCRALFATE 1 G/1
1 TABLET DISSOLVED IN A TABLESPOON OF WATER ON AN EMPTY STOMACH TABLET ORAL
Qty: 120 TABLET | Refills: 11 | Status: ACTIVE | COMMUNITY

## 2024-11-14 RX ORDER — PANTOPRAZOLE SODIUM 40 MG/1
1 TABLET TABLET, DELAYED RELEASE ORAL TWICE A DAY
Qty: 180 TABLET | Refills: 3 | Status: ACTIVE | COMMUNITY

## 2024-11-14 RX ORDER — HYOSCYAMINE SULFATE 0.12 MG/1
1 TABLET UNDER THE TONGUE AND ALLOW TO DISSOLVE AS NEEDED TABLET, ORALLY DISINTEGRATING ORAL THREE TIMES A DAY
Qty: 90 | Refills: 11 | OUTPATIENT
Start: 2024-11-14 | End: 2025-11-09

## 2024-11-14 RX ORDER — DEXLANSOPRAZOLE 60 MG/1
1 CAPSULE CAPSULE, DELAYED RELEASE ORAL ONCE A DAY
Qty: 90 CAPSULE | Refills: 3 | Status: ACTIVE | COMMUNITY

## 2024-11-14 NOTE — HPI-OTHER HISTORIES
10/6/21 Mrs Oralia Romero is a 70 YO F who presents for first visit sine 2014 with complaints of heartburn and reflux despite Dexilant, hoarseness, and dysphagia for solids. She had similar sypmtoms in 2014. Her dysphagia resolved after dilation. Recently, she has dyspeptic symptoms with breakthrough heartburn.  Last EGD in 2008 with mild gastritis and no findings to explain her dysphagia but improvement in her symptoms after dilation. TG  12/8/2021 Ms Oralia Romero presents for follow up for GERD symptoms after completing EGD and UGI.   UGI 10/29/2021: small sliding HH, mild GERD; tablet passed EGD 11/9/2021: mildly severe distal erosive esophagitis; nothing to explain dysphagia s/p empiric dilation with [place] 50Fr; small HH. Path c/w gastropathy negative for h. pylori, GE junction and random esophageal bx c/w reflux changes, no EoE or Barretts.  She is doing much better on pantoprazole 40mg bid. This works better than the Dexilant was working. She does continue with some cough that is nonproductive, hoarseness, and had severe heartburn after eating BBQ a few days ago. This resulted in some vomiting. Denies abdominal pain. No new issues. TG  3/9/22 Mrs Barton presents for follow up for GERD. Her , Kwaku, is with her today. She continues to struggle with heartburn with burning in her lower chest. At times sx are so severe she is nauseated and feels like she may vomit. She is taking pantoprazole in the morning, but after she has 2 cups of coffee. She often forgets her evening dose. If she remembers it, she takes it at bedtime rather than prior to supper. She takes Maalox or mylanta when she has severe symtpoms.  She drinks 2 cups of coffee in AM and 2 cups of coffee in the evenings. Otherwise, she is doing a good job with reflux precautions. No dysphagia since dilation in November.  She last had colonoscopy 3/2014 with sigmoid diverticulosis and recommended to repeat in 10 years. TG  5/4/2022 Ms Barton presents for follow up for GERD. Kwaku is with her today. She has reduced her coffee intake to 1 1/2 cups in AM and 1 1/2 cups in the afternoon. She is better with taking pantoprazole 30 minutes before breakfast and 30 minutes before supper with her famotidine at bedtime. Symptoms are improved. She has breakthrough that leads to nausea about once every two weeks. She is not adhering to reflux diet. We discussed this again today and gave handout. We had a long discussion regarding BRAVO and possibly surgery however she does not want to pursue that at this time. She would like to work harder on dietary management. Discussed side effects of high dose PPI and ideally would reduce to before breakfast with famotidine at bedtime. She verbalized understanding. TG  11/4/2022 Ms Barton presents for follow up for GERD. She was last seen in May by Inna Junior NP. She was doing ok at her last OV so her protonix was reduced to 40mg in the morning and pecid at bedtime. She is now having worsening reflux. She is trying to watch her diet but symptoms continue. She denies any trouble swallowing. CS  5/25/2023 Ms Barton presents for follow up for GERD. At her last OV, she was still having breakthrough on protonix 40mg daily and pepcid at bedtime. She was given protonix 40mg BID and pepcid at bedtime. Today, she continues to have a lot of burning in her esophagus. She denies any trouble swallowing. CS  7/5/2023 EGD: smal hiatal hernia, gastritis. Path PPI effect  9/14/2023 Ms Barton presents for follow up for GERD. She is feeling better on omeprazole 40mg BID, pepcid, and carafate. She admits to a lot of stress and not sleeping well. She will still have some burning but overall better. CS 11/30/2023 Ms Barton presents for follow up for GERD. She was feeling a little better at her last OV on omeprazole 40mg BID, pepcid, and carafate. Today, she is no longer feeling better. She continues to have burning in her throat and esophagus. She has a feeling like food is stuck. She is taking AMT and it has helped her sleep but not changed her reflux symptoms. CS  2/29/2024 Ms Barton presents for follow up for GERD. At  last OV, she continued to have burning in her throat and esophagus and feeling like food was getting stuck. She had an upper GI series that showed reflux into the thoracic esophagus and spasms of the esophagus. No narrowing. Today, she feels the carafate has helped but she continues to have reflux despite omeprazole and pepcid. She has tried and failed protonix and nexium in the past. CS  5/30/2024 Ms Barton presents for follow up for GERD. At  last OV, she felt the carafate helped but she continued to have reflux despite omeprazole and pepcid and she continued to have food get stuck. She was changed to dexilant and AMT was added. Today, she never got the dexilant. She does however feel like things are a little better than they were but not resovled. She does feel the AMT helped more in the beginning but not as much now. She is due for colonoscopy. Her bowels are normal.  CS  7/11/2024 EGD: small hiatal hernia, normal esophagus, stomach and duodenum Colonoscopy: one small TA polyp in ascending, diverticulosis, and looping colon  8/8/2024 Ms Barton presents for follow up for GERD. At  last OV, she continued to have food get stuck and having reflux. She was given dexilant and AMT. her EGD was normal except for a small hiatal hernia. Today her reflux continues to be an issue. She tried the voquezna samples and thinks it helped. She did not get the dexilant. The carafate helps the most. CS

## 2024-11-14 NOTE — HPI-TODAY'S VISIT:
11/14/2024 Ms Barton presents for follow up for GERD and dysphagia. She has stopped the AMT. She never got the voquezna. She is back taking omeprazole BID and carafate QID. She conitnues to have burning and dysphagia. She does feel it is manageable with the medications. CS

## 2025-01-13 NOTE — PHYSICAL EXAM HENT:
Head,  normocephalic,  atraumatic,  Face,  Face within normal limits,  Ears,  External ears within normal limits,  Nose/Nasopharynx,  External nose  normal appearance,  Lips,  Appearance normal Wt Readings from Last 3 Encounters:   01/13/25 73.1 kg (161 lb 3.2 oz)   12/09/24 67.9 kg (149 lb 12.8 oz)   12/03/24 68 kg (150 lb)     -Continue Aspirin 81 mg qd, coreg 6.25 mg qd, Continue entresto 24-26 mg bid  -Continue low sodium diet   -has f/u appointment w/ cardiology 1/20/25          Orders:    carvedilol (COREG) 6.25 mg tablet; Take 1 tablet (6.25 mg total) by mouth 2 (two) times a day with meals    Empagliflozin (Jardiance) 10 MG TABS tablet; Take 1 tablet (10 mg total) by mouth every morning

## 2025-02-13 ENCOUNTER — OFFICE VISIT (OUTPATIENT)
Dept: URBAN - NONMETROPOLITAN AREA CLINIC 13 | Facility: CLINIC | Age: 75
End: 2025-02-13

## 2025-03-27 ENCOUNTER — OFFICE VISIT (OUTPATIENT)
Dept: URBAN - NONMETROPOLITAN AREA CLINIC 13 | Facility: CLINIC | Age: 75
End: 2025-03-27

## 2025-03-28 ENCOUNTER — OFFICE VISIT (OUTPATIENT)
Dept: URBAN - NONMETROPOLITAN AREA CLINIC 13 | Facility: CLINIC | Age: 75
End: 2025-03-28
Payer: MEDICARE

## 2025-03-28 ENCOUNTER — TELEPHONE ENCOUNTER (OUTPATIENT)
Dept: URBAN - NONMETROPOLITAN AREA CLINIC 2 | Facility: CLINIC | Age: 75
End: 2025-03-28

## 2025-03-28 DIAGNOSIS — R13.19 ESOPHAGEAL DYSPHAGIA: ICD-10-CM

## 2025-03-28 DIAGNOSIS — Z12.11 COLON CANCER SCREENING: ICD-10-CM

## 2025-03-28 DIAGNOSIS — R49.0 HOARSENESS: ICD-10-CM

## 2025-03-28 DIAGNOSIS — K21.9 GASTROESOPHAGEAL REFLUX DISEASE WITHOUT ESOPHAGITIS: ICD-10-CM

## 2025-03-28 PROCEDURE — 99213 OFFICE O/P EST LOW 20 MIN: CPT | Performed by: NURSE PRACTITIONER

## 2025-03-28 RX ORDER — ATORVASTATIN CALCIUM 10 MG/1
TAKE 1 TABLET BY MOUTH AT BEDTIME TABLET, FILM COATED ORAL
Qty: 90 EACH | Refills: 0 | Status: ACTIVE | COMMUNITY

## 2025-03-28 RX ORDER — LOSARTAN POTASSIUM AND HYDROCHLOROTHIAZIDE 50; 12.5 MG/1; MG/1
TAKE 1 TABLET BY MOUTH EVERY DAY TABLET, FILM COATED ORAL
Qty: 90 EACH | Refills: 2 | Status: ACTIVE | COMMUNITY

## 2025-03-28 RX ORDER — FAMOTIDINE 40 MG/1
1 TABLET AT BEDTIME TABLET, FILM COATED ORAL ONCE A DAY
Qty: 90 TABLET | Refills: 3 | OUTPATIENT
Start: 2025-03-28

## 2025-03-28 RX ORDER — SUCRALFATE 1 G/1
1 TABLET DISSOLVED IN A TABLESPOON OF WATER ON AN EMPTY STOMACH TABLET ORAL
Qty: 120 TABLET | Refills: 11 | Status: ACTIVE | COMMUNITY

## 2025-03-28 RX ORDER — VONOPRAZAN FUMARATE 26.72 MG/1
1 TABLET TABLET ORAL ONCE A DAY
Qty: 90 TABLET | Refills: 3 | Status: ACTIVE | COMMUNITY

## 2025-03-28 RX ORDER — HYOSCYAMINE SULFATE 0.12 MG/1
1 TABLET UNDER THE TONGUE AND ALLOW TO DISSOLVE AS NEEDED TABLET, ORALLY DISINTEGRATING ORAL THREE TIMES A DAY
Qty: 90 | Refills: 11 | Status: ACTIVE | COMMUNITY
Start: 2024-11-14 | End: 2025-11-09

## 2025-03-28 RX ORDER — SUCRALFATE 1 G/1
1 TABLET DISSOLVED IN A TABLESPOON OF WATER ON AN EMPTY STOMACH TABLET ORAL
Qty: 120 TABLET | Refills: 11 | OUTPATIENT
Start: 2025-03-28

## 2025-03-28 RX ORDER — LEVOTHYROXINE SODIUM 25 UG/1
TAKE 1 TABLET BY MOUTH EVERY DAY TABLET ORAL
Qty: 90 EACH | Refills: 1 | Status: ACTIVE | COMMUNITY

## 2025-03-28 RX ORDER — FAMOTIDINE 40 MG/1
1 TABLET AT BEDTIME TABLET, FILM COATED ORAL ONCE A DAY
Qty: 90 TABLET | Refills: 3 | Status: ON HOLD | COMMUNITY

## 2025-03-28 RX ORDER — PANTOPRAZOLE SODIUM 40 MG/1
1 TABLET TABLET, DELAYED RELEASE ORAL TWICE A DAY
Qty: 180 TABLET | Refills: 3 | Status: DISCONTINUED | COMMUNITY

## 2025-03-28 RX ORDER — HYOSCYAMINE SULFATE 0.12 MG/1
1 TABLET UNDER THE TONGUE AND ALLOW TO DISSOLVE AS NEEDED TABLET, ORALLY DISINTEGRATING ORAL THREE TIMES A DAY
Qty: 90 | Refills: 11 | OUTPATIENT
Start: 2025-03-28

## 2025-03-28 RX ORDER — VONOPRAZAN FUMARATE 26.72 MG/1
1 TABLET TABLET ORAL ONCE A DAY
Qty: 90 TABLET | Refills: 3 | OUTPATIENT
Start: 2025-03-28 | End: 2026-03-23

## 2025-03-28 RX ORDER — DEXLANSOPRAZOLE 60 MG/1
1 CAPSULE CAPSULE, DELAYED RELEASE ORAL ONCE A DAY
Qty: 90 CAPSULE | Refills: 3 | Status: DISCONTINUED | COMMUNITY

## 2025-03-28 RX ORDER — OMEPRAZOLE 40 MG/1
1 CAPSULE 30 MINUTES BEFORE MEALS CAPSULE, DELAYED RELEASE ORAL TWICE A DAY
Qty: 180 | Refills: 3 | Status: DISCONTINUED | COMMUNITY

## 2025-03-28 RX ORDER — AMITRIPTYLINE HYDROCHLORIDE 25 MG/1
1 TABLET AT BEDTIME TABLET, FILM COATED ORAL ONCE A DAY
Qty: 90 TABLET | Refills: 3 | Status: DISCONTINUED | COMMUNITY

## 2025-03-28 NOTE — HPI-OTHER HISTORIES
10/6/21 Mrs Oralia Romero is a 72 YO F who presents for first visit sine 2014 with complaints of heartburn and reflux despite Dexilant, hoarseness, and dysphagia for solids. She had similar sypmtoms in 2014. Her dysphagia resolved after dilation. Recently, she has dyspeptic symptoms with breakthrough heartburn.  Last EGD in 2008 with mild gastritis and no findings to explain her dysphagia but improvement in her symptoms after dilation. TG  12/8/2021 Ms Oralia Romero presents for follow up for GERD symptoms after completing EGD and UGI.   UGI 10/29/2021: small sliding HH, mild GERD; tablet passed EGD 11/9/2021: mildly severe distal erosive esophagitis; nothing to explain dysphagia s/p empiric dilation with [place] 50Fr; small HH. Path c/w gastropathy negative for h. pylori, GE junction and random esophageal bx c/w reflux changes, no EoE or Barretts.  She is doing much better on pantoprazole 40mg bid. This works better than the Dexilant was working. She does continue with some cough that is nonproductive, hoarseness, and had severe heartburn after eating BBQ a few days ago. This resulted in some vomiting. Denies abdominal pain. No new issues. TG  3/9/22 Mrs Barton presents for follow up for GERD. Her , Kwaku, is with her today. She continues to struggle with heartburn with burning in her lower chest. At times sx are so severe she is nauseated and feels like she may vomit. She is taking pantoprazole in the morning, but after she has 2 cups of coffee. She often forgets her evening dose. If she remembers it, she takes it at bedtime rather than prior to supper. She takes Maalox or mylanta when she has severe symtpoms.  She drinks 2 cups of coffee in AM and 2 cups of coffee in the evenings. Otherwise, she is doing a good job with reflux precautions. No dysphagia since dilation in November.  She last had colonoscopy 3/2014 with sigmoid diverticulosis and recommended to repeat in 10 years. TG  5/4/2022 Ms Barton presents for follow up for GERD. Kwaku is with her today. She has reduced her coffee intake to 1 1/2 cups in AM and 1 1/2 cups in the afternoon. She is better with taking pantoprazole 30 minutes before breakfast and 30 minutes before supper with her famotidine at bedtime. Symptoms are improved. She has breakthrough that leads to nausea about once every two weeks. She is not adhering to reflux diet. We discussed this again today and gave handout. We had a long discussion regarding BRAVO and possibly surgery however she does not want to pursue that at this time. She would like to work harder on dietary management. Discussed side effects of high dose PPI and ideally would reduce to before breakfast with famotidine at bedtime. She verbalized understanding. TG  11/4/2022 Ms Barton presents for follow up for GERD. She was last seen in May by Inna Junior NP. She was doing ok at her last OV so her protonix was reduced to 40mg in the morning and pecid at bedtime. She is now having worsening reflux. She is trying to watch her diet but symptoms continue. She denies any trouble swallowing. CS  5/25/2023 Ms Barton presents for follow up for GERD. At her last OV, she was still having breakthrough on protonix 40mg daily and pepcid at bedtime. She was given protonix 40mg BID and pepcid at bedtime. Today, she continues to have a lot of burning in her esophagus. She denies any trouble swallowing. CS  7/5/2023 EGD: smal hiatal hernia, gastritis. Path PPI effect  9/14/2023 Ms Barton presents for follow up for GERD. She is feeling better on omeprazole 40mg BID, pepcid, and carafate. She admits to a lot of stress and not sleeping well. She will still have some burning but overall better. CS 11/30/2023 Ms Barton presents for follow up for GERD. She was feeling a little better at her last OV on omeprazole 40mg BID, pepcid, and carafate. Today, she is no longer feeling better. She continues to have burning in her throat and esophagus. She has a feeling like food is stuck. She is taking AMT and it has helped her sleep but not changed her reflux symptoms. CS  2/29/2024 Ms Barton presents for follow up for GERD. At  last OV, she continued to have burning in her throat and esophagus and feeling like food was getting stuck. She had an upper GI series that showed reflux into the thoracic esophagus and spasms of the esophagus. No narrowing. Today, she feels the carafate has helped but she continues to have reflux despite omeprazole and pepcid. She has tried and failed protonix and nexium in the past. CS  5/30/2024 Ms Barton presents for follow up for GERD. At  last OV, she felt the carafate helped but she continued to have reflux despite omeprazole and pepcid and she continued to have food get stuck. She was changed to dexilant and AMT was added. Today, she never got the dexilant. She does however feel like things are a little better than they were but not resovled. She does feel the AMT helped more in the beginning but not as much now. She is due for colonoscopy. Her bowels are normal.  CS  7/11/2024 EGD: small hiatal hernia, normal esophagus, stomach and duodenum Colonoscopy: one small TA polyp in ascending, diverticulosis, and looping colon  8/8/2024 Ms Barton presents for follow up for GERD. At  last OV, she continued to have food get stuck and having reflux. She was given dexilant and AMT. her EGD was normal except for a small hiatal hernia. Today her reflux continues to be an issue. She tried the voquezna samples and thinks it helped. She did not get the dexilant. The carafate helps the most. CS  11/14/2024 Ms Barton presents for follow up for GERD and dysphagia. She has stopped the AMT. She never got the voquezna. She is back taking omeprazole BID and carafate QID. She conitnues to have burning and dysphagia. She does feel it is manageable with the medications. CS

## 2025-03-28 NOTE — HPI-TODAY'S VISIT:
3/28/2025 Ms Barton presents for follow up for GERD and dysphagia. At her last OV, she continued to be miserable on omeprazole and carafate. We again tried to get her voquezna. She was able to get 30 days worth. This worked great and she was doing much better until she ran out. She is now off all PPIs. She is only taking carafate QID. She is burning and scared she is having damage to her esophagus. She did not try the levsin. She was not sure what it was for.  CS

## 2025-04-03 ENCOUNTER — TELEPHONE ENCOUNTER (OUTPATIENT)
Dept: URBAN - NONMETROPOLITAN AREA CLINIC 2 | Facility: CLINIC | Age: 75
End: 2025-04-03

## 2025-04-15 ENCOUNTER — TELEPHONE ENCOUNTER (OUTPATIENT)
Dept: URBAN - NONMETROPOLITAN AREA CLINIC 2 | Facility: CLINIC | Age: 75
End: 2025-04-15

## 2025-04-15 RX ORDER — ESOMEPRAZOLE MAGNESIUM 20 MG/1
1 CAPSULE 1/2 TO 1 HOUR BEFORE MEAL CAPSULE, DELAYED RELEASE PELLETS ORAL TWICE A DAY
Qty: 180 | Refills: 3 | OUTPATIENT
Start: 2025-04-17

## 2025-04-21 ENCOUNTER — TELEPHONE ENCOUNTER (OUTPATIENT)
Dept: URBAN - NONMETROPOLITAN AREA CLINIC 2 | Facility: CLINIC | Age: 75
End: 2025-04-21

## 2025-05-16 ENCOUNTER — OFFICE VISIT (OUTPATIENT)
Dept: URBAN - NONMETROPOLITAN AREA CLINIC 13 | Facility: CLINIC | Age: 75
End: 2025-05-16
Payer: MEDICARE

## 2025-05-16 DIAGNOSIS — Z12.11 COLON CANCER SCREENING: ICD-10-CM

## 2025-05-16 DIAGNOSIS — R49.0 HOARSENESS: ICD-10-CM

## 2025-05-16 DIAGNOSIS — R13.19 ESOPHAGEAL DYSPHAGIA: ICD-10-CM

## 2025-05-16 DIAGNOSIS — K21.9 GASTROESOPHAGEAL REFLUX DISEASE WITHOUT ESOPHAGITIS: ICD-10-CM

## 2025-05-16 PROCEDURE — 99213 OFFICE O/P EST LOW 20 MIN: CPT | Performed by: NURSE PRACTITIONER

## 2025-05-16 RX ORDER — HYOSCYAMINE SULFATE 0.12 MG/1
1 TABLET UNDER THE TONGUE AND ALLOW TO DISSOLVE AS NEEDED TABLET, ORALLY DISINTEGRATING ORAL THREE TIMES A DAY
Qty: 90 | Refills: 11 | Status: ACTIVE | COMMUNITY
Start: 2025-03-28

## 2025-05-16 RX ORDER — LEVOTHYROXINE SODIUM 25 UG/1
TAKE 1 TABLET BY MOUTH EVERY DAY TABLET ORAL
Qty: 90 EACH | Refills: 1 | Status: ACTIVE | COMMUNITY

## 2025-05-16 RX ORDER — FAMOTIDINE 40 MG/1
1 TABLET AT BEDTIME TABLET, FILM COATED ORAL ONCE A DAY
Qty: 90 TABLET | Refills: 3 | OUTPATIENT
Start: 2025-05-16

## 2025-05-16 RX ORDER — HYOSCYAMINE SULFATE 0.12 MG/1
1 TABLET UNDER THE TONGUE AND ALLOW TO DISSOLVE AS NEEDED TABLET, ORALLY DISINTEGRATING ORAL THREE TIMES A DAY
Qty: 90 | Refills: 11 | OUTPATIENT
Start: 2025-05-16

## 2025-05-16 RX ORDER — ESOMEPRAZOLE MAGNESIUM 20 MG/1
1 CAPSULE 1/2 TO 1 HOUR BEFORE MEAL CAPSULE, DELAYED RELEASE PELLETS ORAL TWICE A DAY
Qty: 180 | Refills: 3 | Status: ACTIVE | COMMUNITY
Start: 2025-04-17

## 2025-05-16 RX ORDER — SUCRALFATE 1 G/1
1 TABLET DISSOLVED IN A TABLESPOON OF WATER ON AN EMPTY STOMACH TABLET ORAL
Qty: 120 TABLET | Refills: 11 | Status: ON HOLD | COMMUNITY
Start: 2025-03-28

## 2025-05-16 RX ORDER — VONOPRAZAN FUMARATE 26.72 MG/1
1 TABLET TABLET ORAL ONCE A DAY
Qty: 90 TABLET | Refills: 3 | Status: ON HOLD | COMMUNITY
Start: 2025-03-28 | End: 2026-03-23

## 2025-05-16 RX ORDER — SUCRALFATE 1 G/1
1 TABLET DISSOLVED IN A TABLESPOON OF WATER ON AN EMPTY STOMACH TABLET ORAL
Qty: 120 TABLET | Refills: 11 | OUTPATIENT
Start: 2025-05-16

## 2025-05-16 RX ORDER — VONOPRAZAN FUMARATE 26.72 MG/1
1 TABLET TABLET ORAL ONCE A DAY
Qty: 90 TABLET | Refills: 3 | OUTPATIENT
Start: 2025-05-16 | End: 2026-05-11

## 2025-05-16 RX ORDER — FAMOTIDINE 40 MG/1
1 TABLET AT BEDTIME TABLET, FILM COATED ORAL ONCE A DAY
Qty: 90 TABLET | Refills: 3 | Status: ON HOLD | COMMUNITY
Start: 2025-03-28

## 2025-05-16 RX ORDER — ESOMEPRAZOLE MAGNESIUM 20 MG/1
1 CAPSULE 1/2 TO 1 HOUR BEFORE MEAL CAPSULE, DELAYED RELEASE PELLETS ORAL TWICE A DAY
Qty: 180 | Refills: 3
Start: 2025-04-17

## 2025-05-16 RX ORDER — ATORVASTATIN CALCIUM 10 MG/1
TAKE 1 TABLET BY MOUTH AT BEDTIME TABLET, FILM COATED ORAL
Qty: 90 EACH | Refills: 0 | Status: ACTIVE | COMMUNITY

## 2025-05-16 RX ORDER — LOSARTAN POTASSIUM AND HYDROCHLOROTHIAZIDE 50; 12.5 MG/1; MG/1
TAKE 1 TABLET BY MOUTH EVERY DAY TABLET, FILM COATED ORAL
Qty: 90 EACH | Refills: 2 | Status: ACTIVE | COMMUNITY

## 2025-05-16 NOTE — HPI-OTHER HISTORIES
10/6/21 Mrs Oralia Romero is a 70 YO F who presents for first visit sine 2014 with complaints of heartburn and reflux despite Dexilant, hoarseness, and dysphagia for solids. She had similar sypmtoms in 2014. Her dysphagia resolved after dilation. Recently, she has dyspeptic symptoms with breakthrough heartburn.  Last EGD in 2008 with mild gastritis and no findings to explain her dysphagia but improvement in her symptoms after dilation. TG  12/8/2021 Ms Oralia Romero presents for follow up for GERD symptoms after completing EGD and UGI.   UGI 10/29/2021: small sliding HH, mild GERD; tablet passed EGD 11/9/2021: mildly severe distal erosive esophagitis; nothing to explain dysphagia s/p empiric dilation with [place] 50Fr; small HH. Path c/w gastropathy negative for h. pylori, GE junction and random esophageal bx c/w reflux changes, no EoE or Barretts.  She is doing much better on pantoprazole 40mg bid. This works better than the Dexilant was working. She does continue with some cough that is nonproductive, hoarseness, and had severe heartburn after eating BBQ a few days ago. This resulted in some vomiting. Denies abdominal pain. No new issues. TG  3/9/22 Mrs Barton presents for follow up for GERD. Her , Kwaku, is with her today. She continues to struggle with heartburn with burning in her lower chest. At times sx are so severe she is nauseated and feels like she may vomit. She is taking pantoprazole in the morning, but after she has 2 cups of coffee. She often forgets her evening dose. If she remembers it, she takes it at bedtime rather than prior to supper. She takes Maalox or mylanta when she has severe symtpoms.  She drinks 2 cups of coffee in AM and 2 cups of coffee in the evenings. Otherwise, she is doing a good job with reflux precautions. No dysphagia since dilation in November.  She last had colonoscopy 3/2014 with sigmoid diverticulosis and recommended to repeat in 10 years. TG  5/4/2022 Ms Barton presents for follow up for GERD. Kwaku is with her today. She has reduced her coffee intake to 1 1/2 cups in AM and 1 1/2 cups in the afternoon. She is better with taking pantoprazole 30 minutes before breakfast and 30 minutes before supper with her famotidine at bedtime. Symptoms are improved. She has breakthrough that leads to nausea about once every two weeks. She is not adhering to reflux diet. We discussed this again today and gave handout. We had a long discussion regarding BRAVO and possibly surgery however she does not want to pursue that at this time. She would like to work harder on dietary management. Discussed side effects of high dose PPI and ideally would reduce to before breakfast with famotidine at bedtime. She verbalized understanding. TG  11/4/2022 Ms Barton presents for follow up for GERD. She was last seen in May by Inna Junior NP. She was doing ok at her last OV so her protonix was reduced to 40mg in the morning and pecid at bedtime. She is now having worsening reflux. She is trying to watch her diet but symptoms continue. She denies any trouble swallowing. CS  5/25/2023 Ms Barton presents for follow up for GERD. At her last OV, she was still having breakthrough on protonix 40mg daily and pepcid at bedtime. She was given protonix 40mg BID and pepcid at bedtime. Today, she continues to have a lot of burning in her esophagus. She denies any trouble swallowing. CS  7/5/2023 EGD: smal hiatal hernia, gastritis. Path PPI effect  9/14/2023 Ms Barton presents for follow up for GERD. She is feeling better on omeprazole 40mg BID, pepcid, and carafate. She admits to a lot of stress and not sleeping well. She will still have some burning but overall better. CS 11/30/2023 Ms Barton presents for follow up for GERD. She was feeling a little better at her last OV on omeprazole 40mg BID, pepcid, and carafate. Today, she is no longer feeling better. She continues to have burning in her throat and esophagus. She has a feeling like food is stuck. She is taking AMT and it has helped her sleep but not changed her reflux symptoms. CS  2/29/2024 Ms Barton presents for follow up for GERD. At  last OV, she continued to have burning in her throat and esophagus and feeling like food was getting stuck. She had an upper GI series that showed reflux into the thoracic esophagus and spasms of the esophagus. No narrowing. Today, she feels the carafate has helped but she continues to have reflux despite omeprazole and pepcid. She has tried and failed protonix and nexium in the past. CS  5/30/2024 Ms Barton presents for follow up for GERD. At  last OV, she felt the carafate helped but she continued to have reflux despite omeprazole and pepcid and she continued to have food get stuck. She was changed to dexilant and AMT was added. Today, she never got the dexilant. She does however feel like things are a little better than they were but not resovled. She does feel the AMT helped more in the beginning but not as much now. She is due for colonoscopy. Her bowels are normal.  CS  7/11/2024 EGD: small hiatal hernia, normal esophagus, stomach and duodenum Colonoscopy: one small TA polyp in ascending, diverticulosis, and looping colon  8/8/2024 Ms Barton presents for follow up for GERD. At  last OV, she continued to have food get stuck and having reflux. She was given dexilant and AMT. her EGD was normal except for a small hiatal hernia. Today her reflux continues to be an issue. She tried the voquezna samples and thinks it helped. She did not get the dexilant. The carafate helps the most. CS  11/14/2024 Ms Barton presents for follow up for GERD and dysphagia. She has stopped the AMT. She never got the voquezna. She is back taking omeprazole BID and carafate QID. She conitnues to have burning and dysphagia. She does feel it is manageable with the medications. CS  3/28/2025 Ms Barton presents for follow up for GERD and dysphagia. At her last OV, she continued to be miserable on omeprazole and carafate. We again tried to get her voquezna. She was able to get 30 days worth. This worked great and she was doing much better until she ran out. She is now off all PPIs. She is only taking carafate QID. She is burning and scared she is having damage to her esophagus. She did not try the levsin. She was not sure what it was for.  CS

## 2025-05-16 NOTE — HPI-TODAY'S VISIT:
5/16/2025 Ms Barton presents for follow up for GERD and dysphagia. At her last OV, she was doing better on voquezna. She ran out of voquezna and it was very expensive so she went back to nexium 20mg BID. THis is helping. She has only had one episode and this was with red sauce. She has been watching her diet and this is helping. Overall, she is feeling better.  CS

## 2025-08-14 ENCOUNTER — TELEPHONE ENCOUNTER (OUTPATIENT)
Dept: URBAN - NONMETROPOLITAN AREA CLINIC 2 | Facility: CLINIC | Age: 75
End: 2025-08-14

## 2025-08-14 ENCOUNTER — OFFICE VISIT (OUTPATIENT)
Dept: URBAN - NONMETROPOLITAN AREA CLINIC 13 | Facility: CLINIC | Age: 75
End: 2025-08-14
Payer: MEDICARE

## 2025-08-14 DIAGNOSIS — R49.0 HOARSENESS: ICD-10-CM

## 2025-08-14 DIAGNOSIS — K21.9 GASTROESOPHAGEAL REFLUX DISEASE WITHOUT ESOPHAGITIS: ICD-10-CM

## 2025-08-14 DIAGNOSIS — R13.19 ESOPHAGEAL DYSPHAGIA: ICD-10-CM

## 2025-08-14 DIAGNOSIS — Z12.11 COLON CANCER SCREENING: ICD-10-CM

## 2025-08-14 PROCEDURE — 99213 OFFICE O/P EST LOW 20 MIN: CPT | Performed by: NURSE PRACTITIONER

## 2025-08-14 RX ORDER — LOSARTAN POTASSIUM AND HYDROCHLOROTHIAZIDE 50; 12.5 MG/1; MG/1
TAKE 1 TABLET BY MOUTH EVERY DAY TABLET, FILM COATED ORAL
Qty: 90 EACH | Refills: 2 | Status: ACTIVE | COMMUNITY

## 2025-08-14 RX ORDER — FAMOTIDINE 40 MG/1
1 TABLET AT BEDTIME TABLET, FILM COATED ORAL ONCE A DAY
Qty: 90 TABLET | Refills: 3 | Status: ACTIVE | COMMUNITY
Start: 2025-05-16

## 2025-08-14 RX ORDER — VONOPRAZAN FUMARATE 26.72 MG/1
1 TABLET TABLET ORAL ONCE A DAY
Qty: 90 TABLET | Refills: 3 | Status: ACTIVE | COMMUNITY
Start: 2025-05-16 | End: 2026-05-11

## 2025-08-14 RX ORDER — HYOSCYAMINE SULFATE 0.12 MG/1
1 TABLET UNDER THE TONGUE AND ALLOW TO DISSOLVE AS NEEDED TABLET, ORALLY DISINTEGRATING ORAL THREE TIMES A DAY
Qty: 90 | Refills: 11 | Status: ACTIVE | COMMUNITY
Start: 2025-05-16

## 2025-08-14 RX ORDER — SUCRALFATE 1 G/1
1 TABLET DISSOLVED IN A TABLESPOON OF WATER ON AN EMPTY STOMACH TABLET ORAL
Qty: 120 TABLET | Refills: 11 | Status: ACTIVE | COMMUNITY
Start: 2025-05-16

## 2025-08-14 RX ORDER — ATORVASTATIN CALCIUM 10 MG/1
TAKE 1 TABLET BY MOUTH AT BEDTIME TABLET, FILM COATED ORAL
Qty: 90 EACH | Refills: 0 | Status: ACTIVE | COMMUNITY

## 2025-08-14 RX ORDER — FAMOTIDINE 40 MG/1
1 TABLET AT BEDTIME TABLET, FILM COATED ORAL ONCE A DAY
Qty: 90 TABLET | Refills: 3 | OUTPATIENT
Start: 2025-08-14

## 2025-08-14 RX ORDER — SUCRALFATE 1 G/1
1 TABLET DISSOLVED IN A TABLESPOON OF WATER ON AN EMPTY STOMACH TABLET ORAL
Qty: 120 TABLET | Refills: 11 | OUTPATIENT
Start: 2025-08-14

## 2025-08-14 RX ORDER — HYOSCYAMINE SULFATE 0.12 MG/1
1 TABLET UNDER THE TONGUE AND ALLOW TO DISSOLVE AS NEEDED TABLET, ORALLY DISINTEGRATING ORAL THREE TIMES A DAY
Qty: 90 | Refills: 11 | OUTPATIENT
Start: 2025-08-14

## 2025-08-14 RX ORDER — ESOMEPRAZOLE MAGNESIUM 20 MG/1
1 CAPSULE 1/2 TO 1 HOUR BEFORE MEAL CAPSULE, DELAYED RELEASE PELLETS ORAL TWICE A DAY
Qty: 180 | Refills: 3 | Status: ACTIVE | COMMUNITY
Start: 2025-04-17

## 2025-08-14 RX ORDER — LEVOTHYROXINE SODIUM 25 UG/1
TAKE 1 TABLET BY MOUTH EVERY DAY TABLET ORAL
Qty: 90 EACH | Refills: 1 | Status: ACTIVE | COMMUNITY

## 2025-08-14 RX ORDER — ESOMEPRAZOLE MAGNESIUM 20 MG/1
1 CAPSULE 1/2 TO 1 HOUR BEFORE MEAL CAPSULE, DELAYED RELEASE PELLETS ORAL TWICE A DAY
Qty: 180 | Refills: 3
Start: 2025-08-14